# Patient Record
Sex: FEMALE | Race: BLACK OR AFRICAN AMERICAN | NOT HISPANIC OR LATINO | Employment: FULL TIME | URBAN - METROPOLITAN AREA
[De-identification: names, ages, dates, MRNs, and addresses within clinical notes are randomized per-mention and may not be internally consistent; named-entity substitution may affect disease eponyms.]

---

## 2018-03-28 ENCOUNTER — OFFICE VISIT (OUTPATIENT)
Dept: OBSTETRICS AND GYNECOLOGY | Facility: CLINIC | Age: 38
End: 2018-03-28
Payer: MEDICAID

## 2018-03-28 VITALS
HEIGHT: 61 IN | WEIGHT: 200.63 LBS | DIASTOLIC BLOOD PRESSURE: 94 MMHG | BODY MASS INDEX: 37.88 KG/M2 | SYSTOLIC BLOOD PRESSURE: 161 MMHG

## 2018-03-28 DIAGNOSIS — Z11.3 SCREENING FOR GONORRHEA: ICD-10-CM

## 2018-03-28 DIAGNOSIS — B96.89 BACTERIAL VAGINOSIS: ICD-10-CM

## 2018-03-28 DIAGNOSIS — N76.0 BACTERIAL VAGINOSIS: ICD-10-CM

## 2018-03-28 DIAGNOSIS — Z12.4 SCREENING FOR CERVICAL CANCER: ICD-10-CM

## 2018-03-28 DIAGNOSIS — Z01.419 ENCOUNTER FOR GYNECOLOGICAL EXAMINATION (GENERAL) (ROUTINE) WITHOUT ABNORMAL FINDINGS: Primary | ICD-10-CM

## 2018-03-28 PROCEDURE — 87491 CHLMYD TRACH DNA AMP PROBE: CPT

## 2018-03-28 PROCEDURE — 99395 PREV VISIT EST AGE 18-39: CPT | Mod: S$PBB,,, | Performed by: OBSTETRICS & GYNECOLOGY

## 2018-03-28 PROCEDURE — 88175 CYTOPATH C/V AUTO FLUID REDO: CPT

## 2018-03-28 PROCEDURE — 99213 OFFICE O/P EST LOW 20 MIN: CPT | Mod: PBBFAC | Performed by: OBSTETRICS & GYNECOLOGY

## 2018-03-28 PROCEDURE — 99999 PR PBB SHADOW E&M-EST. PATIENT-LVL III: CPT | Mod: PBBFAC,,, | Performed by: OBSTETRICS & GYNECOLOGY

## 2018-03-28 PROCEDURE — 87480 CANDIDA DNA DIR PROBE: CPT

## 2018-03-28 NOTE — PROGRESS NOTES
Subjective:       Patient ID: Sima Pham is a 37 y.o. female.    Chief Complaint:  Well Woman      History of Present Illness  HPI  Annual Exam-Premenopausal  Patient presents for annual exam. The patient has no complaints today. The patient is sexually active--no condoms, current partner of 3 yrs; denies pelvic pain with intercourse, no prob with lubrication or libido; . GYN screening history: last pap: approximate date  and was normal. The patient wears seatbelts: yes. The patient participates in regular exercise: no. Has the patient ever been transfused or tattooed?: yes.--tatooes;  The patient reports that there is not domestic violence in her life.    Menses monthly, flow 4-6  days; super tampon; change q 2-3 hr (want to, not have to); dysmenorrhea--relief with ibuprofen;         GYN & OB History  Patient's last menstrual period was 2018.   Date of Last Pap: No result found    OB History    Para Term  AB Living   2       2     SAB TAB Ectopic Multiple Live Births     2            # Outcome Date GA Lbr Fuentes/2nd Weight Sex Delivery Anes PTL Lv   2 TAB            1 TAB                   Review of Systems  Review of Systems   Constitutional: Negative for activity change, appetite change, chills, diaphoresis, fatigue, fever and unexpected weight change.   HENT: Negative for mouth sores and tinnitus.    Eyes: Negative for discharge and visual disturbance.   Respiratory: Negative for cough, shortness of breath and wheezing.    Cardiovascular: Negative for chest pain, palpitations and leg swelling.   Gastrointestinal: Negative for abdominal pain, bloating, blood in stool, constipation, diarrhea, nausea and vomiting.   Endocrine: Negative for diabetes, hair loss, hot flashes, hyperthyroidism and hypothyroidism.   Genitourinary: Negative for decreased libido, dyspareunia, dysuria, flank pain, frequency, genital sores, hematuria, menorrhagia, menstrual problem, pelvic pain, urgency, vaginal  bleeding, vaginal discharge, vaginal pain, dysmenorrhea, urinary incontinence, postcoital bleeding, postmenopausal bleeding and vaginal odor.   Musculoskeletal: Negative for back pain and myalgias.   Skin:  Negative for rash, no acne and hair changes.   Neurological: Negative for seizures, syncope, numbness and headaches.   Hematological: Negative for adenopathy. Does not bruise/bleed easily.   Psychiatric/Behavioral: Negative for depression and sleep disturbance. The patient is not nervous/anxious.    Breast: Negative for breast mass, breast pain, nipple discharge and skin changes          Objective:    Physical Exam:   Constitutional: She appears well-developed.     Eyes: Conjunctivae and EOM are normal. Pupils are equal, round, and reactive to light.    Neck: Normal range of motion. Neck supple.     Pulmonary/Chest: Effort normal. Right breast exhibits no mass, no nipple discharge, no skin change and no tenderness. Left breast exhibits no mass, no nipple discharge, no skin change and no tenderness. Breasts are symmetrical.        Abdominal: Soft.     Genitourinary: Rectum normal, vagina normal and uterus normal. Pelvic exam was performed with patient supine. Cervix is normal. Right adnexum displays no mass and no tenderness. Left adnexum displays no mass and no tenderness. No erythema, bleeding, rectocele, cystocele or unspecified prolapse of vaginal walls in the vagina. No vaginal discharge found. Labial bartholins normal.       Uterus Size: 6 cm   Musculoskeletal: Normal range of motion.       Neurological: She is alert.    Skin: Skin is warm.    Psychiatric: She has a normal mood and affect.          Assessment:     Encounter Diagnoses   Name Primary?    Encounter for gynecological examination (general) (routine) without abnormal findings Yes    Screening for cervical cancer     Screening for gonorrhea     Bacterial vaginosis                  Plan:      Continue annual well woman exam.  Pap today; Reviewed  updated recommendations for pap smears (every 3 years) in low risk patients.   Recommend annual pelvic exams.  Reviewed recommendations for annual CBE.  Gc/ct/affirm  Advised weight loss, diet, exercise  Prenatal vitamin daily  Pt to self refer to pcp for bp control

## 2018-03-30 LAB
C TRACH DNA SPEC QL NAA+PROBE: NOT DETECTED
CANDIDA RRNA VAG QL PROBE: NEGATIVE
G VAGINALIS RRNA GENITAL QL PROBE: POSITIVE
N GONORRHOEA DNA SPEC QL NAA+PROBE: NOT DETECTED
T VAGINALIS RRNA GENITAL QL PROBE: NEGATIVE

## 2018-04-02 ENCOUNTER — PATIENT MESSAGE (OUTPATIENT)
Dept: OBSTETRICS AND GYNECOLOGY | Facility: CLINIC | Age: 38
End: 2018-04-02

## 2018-04-02 DIAGNOSIS — N76.0 BACTERIAL VAGINOSIS: Primary | ICD-10-CM

## 2018-04-02 DIAGNOSIS — B96.89 BACTERIAL VAGINOSIS: Primary | ICD-10-CM

## 2018-04-02 RX ORDER — METRONIDAZOLE 500 MG/1
500 TABLET ORAL EVERY 12 HOURS
Qty: 14 TABLET | Refills: 0 | Status: SHIPPED | OUTPATIENT
Start: 2018-04-02 | End: 2018-04-09

## 2018-05-23 ENCOUNTER — TELEPHONE (OUTPATIENT)
Dept: OBSTETRICS AND GYNECOLOGY | Facility: CLINIC | Age: 38
End: 2018-05-23

## 2018-05-23 NOTE — TELEPHONE ENCOUNTER
----- Message from Yoshi Yap MA sent at 5/23/2018  4:10 PM CDT -----  Contact: Pt   Pt called and would like to schedule a appt regarding missed period.      Pt can be reached at [phone].      Thanks

## 2018-05-23 NOTE — TELEPHONE ENCOUNTER
Patient states that last period was April 14th. She states that she has never missed her period. Patient took many pregnancy tests and they are negative so she would like to be checked to see what is going on with her menstrual cycle. Scheduled an appointment with Guillermina salcedo NP for 05/25/18 at 8:45am at the [facility] location. Patient verbalized understanding.

## 2018-05-25 ENCOUNTER — LAB VISIT (OUTPATIENT)
Dept: LAB | Facility: HOSPITAL | Age: 38
End: 2018-05-25
Attending: NURSE PRACTITIONER
Payer: MEDICAID

## 2018-05-25 ENCOUNTER — OFFICE VISIT (OUTPATIENT)
Dept: OBSTETRICS AND GYNECOLOGY | Facility: CLINIC | Age: 38
End: 2018-05-25
Payer: MEDICAID

## 2018-05-25 ENCOUNTER — TELEPHONE (OUTPATIENT)
Dept: OBSTETRICS AND GYNECOLOGY | Facility: CLINIC | Age: 38
End: 2018-05-25

## 2018-05-25 ENCOUNTER — OFFICE VISIT (OUTPATIENT)
Dept: INTERNAL MEDICINE | Facility: CLINIC | Age: 38
End: 2018-05-25
Payer: MEDICAID

## 2018-05-25 VITALS
HEIGHT: 61 IN | BODY MASS INDEX: 37.47 KG/M2 | DIASTOLIC BLOOD PRESSURE: 100 MMHG | SYSTOLIC BLOOD PRESSURE: 170 MMHG | WEIGHT: 198.44 LBS

## 2018-05-25 VITALS
TEMPERATURE: 98 F | WEIGHT: 195.56 LBS | HEART RATE: 94 BPM | BODY MASS INDEX: 36.92 KG/M2 | SYSTOLIC BLOOD PRESSURE: 148 MMHG | DIASTOLIC BLOOD PRESSURE: 84 MMHG | OXYGEN SATURATION: 98 % | HEIGHT: 61 IN

## 2018-05-25 DIAGNOSIS — R03.0 SINGLE EPISODE OF ELEVATED BLOOD PRESSURE: ICD-10-CM

## 2018-05-25 DIAGNOSIS — N91.2 AMENORRHEA: ICD-10-CM

## 2018-05-25 DIAGNOSIS — I10 HYPERTENSION, UNSPECIFIED TYPE: Primary | ICD-10-CM

## 2018-05-25 DIAGNOSIS — N91.2 AMENORRHEA: Primary | ICD-10-CM

## 2018-05-25 LAB
ALBUMIN SERPL BCP-MCNC: 3.9 G/DL
ALP SERPL-CCNC: 85 U/L
ALT SERPL W/O P-5'-P-CCNC: 18 U/L
ANION GAP SERPL CALC-SCNC: 8 MMOL/L
AST SERPL-CCNC: 18 U/L
BASOPHILS # BLD AUTO: 0.02 K/UL
BASOPHILS NFR BLD: 0.5 %
BILIRUB SERPL-MCNC: 1.2 MG/DL
BUN SERPL-MCNC: 10 MG/DL
CALCIUM SERPL-MCNC: 9.5 MG/DL
CHLORIDE SERPL-SCNC: 104 MMOL/L
CO2 SERPL-SCNC: 27 MMOL/L
CREAT SERPL-MCNC: 0.9 MG/DL
DIFFERENTIAL METHOD: ABNORMAL
EOSINOPHIL # BLD AUTO: 0 K/UL
EOSINOPHIL NFR BLD: 0.5 %
ERYTHROCYTE [DISTWIDTH] IN BLOOD BY AUTOMATED COUNT: 13.2 %
EST. GFR  (AFRICAN AMERICAN): >60 ML/MIN/1.73 M^2
EST. GFR  (NON AFRICAN AMERICAN): >60 ML/MIN/1.73 M^2
GLUCOSE SERPL-MCNC: 95 MG/DL
HCG INTACT+B SERPL-ACNC: <1.2 MIU/ML
HCT VFR BLD AUTO: 36.7 %
HGB BLD-MCNC: 12.3 G/DL
LYMPHOCYTES # BLD AUTO: 1.6 K/UL
LYMPHOCYTES NFR BLD: 39.3 %
MCH RBC QN AUTO: 29.8 PG
MCHC RBC AUTO-ENTMCNC: 33.5 G/DL
MCV RBC AUTO: 89 FL
MONOCYTES # BLD AUTO: 0.4 K/UL
MONOCYTES NFR BLD: 9.5 %
NEUTROPHILS # BLD AUTO: 2.1 K/UL
NEUTROPHILS NFR BLD: 50.2 %
PLATELET # BLD AUTO: 197 K/UL
PMV BLD AUTO: 11.1 FL
POTASSIUM SERPL-SCNC: 4 MMOL/L
PROT SERPL-MCNC: 7.9 G/DL
RBC # BLD AUTO: 4.13 M/UL
SODIUM SERPL-SCNC: 139 MMOL/L
TSH SERPL DL<=0.005 MIU/L-ACNC: 1.95 UIU/ML
WBC # BLD AUTO: 4.1 K/UL

## 2018-05-25 PROCEDURE — 84702 CHORIONIC GONADOTROPIN TEST: CPT

## 2018-05-25 PROCEDURE — 84443 ASSAY THYROID STIM HORMONE: CPT

## 2018-05-25 PROCEDURE — 99999 PR PBB SHADOW E&M-EST. PATIENT-LVL II: CPT | Mod: PBBFAC,,, | Performed by: NURSE PRACTITIONER

## 2018-05-25 PROCEDURE — 99212 OFFICE O/P EST SF 10 MIN: CPT | Mod: PBBFAC,27 | Performed by: NURSE PRACTITIONER

## 2018-05-25 PROCEDURE — 99213 OFFICE O/P EST LOW 20 MIN: CPT | Mod: PBBFAC,27 | Performed by: FAMILY MEDICINE

## 2018-05-25 PROCEDURE — 99203 OFFICE O/P NEW LOW 30 MIN: CPT | Mod: S$PBB,,, | Performed by: FAMILY MEDICINE

## 2018-05-25 PROCEDURE — 36415 COLL VENOUS BLD VENIPUNCTURE: CPT

## 2018-05-25 PROCEDURE — 99213 OFFICE O/P EST LOW 20 MIN: CPT | Mod: S$PBB,,, | Performed by: NURSE PRACTITIONER

## 2018-05-25 PROCEDURE — 99999 PR PBB SHADOW E&M-EST. PATIENT-LVL III: CPT | Mod: PBBFAC,,, | Performed by: FAMILY MEDICINE

## 2018-05-25 PROCEDURE — 85025 COMPLETE CBC W/AUTO DIFF WBC: CPT

## 2018-05-25 PROCEDURE — 80053 COMPREHEN METABOLIC PANEL: CPT

## 2018-05-25 RX ORDER — NIFEDIPINE 30 MG/1
30 TABLET, EXTENDED RELEASE ORAL DAILY
Qty: 30 TABLET | Refills: 0 | Status: SHIPPED | OUTPATIENT
Start: 2018-05-25 | End: 2018-06-21 | Stop reason: SDUPTHER

## 2018-05-25 NOTE — PROGRESS NOTES
Subjective:       Patient ID: Sima Pham is a 38 y.o. female.    Chief Complaint: Hypertension; Lumbar Spine Pain (L-Spine); and Abdominal Cramping    HPI       37 yo AAF    Went to Gyn  Noted BP was high        Sent down to have BP evaluated..    Non smoker  Non drinker - rare  No drugs    In March BP was elevated as well - 161/94.    UPT negative.  Blood test obtained to test for pregnancy today. - Negative.    Not trying to get pregnant - but open to the idea      CBC today  Very mild low HCT - normal Hgb    CMP  Very mild bili elevation    TSH WNL and preg test negative.    Review of Systems   Constitutional: Negative for chills and fever.   HENT: Negative for congestion, sinus pressure and voice change.    Eyes: Negative for visual disturbance.   Respiratory: Negative for shortness of breath.    Cardiovascular: Negative for chest pain.   Gastrointestinal: Negative for constipation and diarrhea.   Genitourinary: Negative for difficulty urinating.   Musculoskeletal: Negative for gait problem and myalgias.   Skin: Negative for rash.   Neurological: Negative for dizziness and light-headedness.   Psychiatric/Behavioral: Negative for dysphoric mood.       Objective:       Vitals:    05/25/18 1111   BP: (!) 148/84   Pulse: 94   Temp: 97.7 °F (36.5 °C)       Physical Exam   Constitutional: She is oriented to person, place, and time. She appears well-developed and well-nourished. She does not have a sickly appearance. No distress.   HENT:   Head: Normocephalic and atraumatic.   Right Ear: External ear normal.   Left Ear: External ear normal.   Eyes: Conjunctivae, EOM and lids are normal.   Neck: Trachea normal, normal range of motion and full passive range of motion without pain.   Cardiovascular: Normal rate, regular rhythm and normal heart sounds.    Pulmonary/Chest: Effort normal and breath sounds normal. No stridor. No respiratory distress.   Abdominal: Soft. Normal appearance and bowel sounds are normal. She  exhibits no distension. There is no tenderness. There is no guarding. No hernia.   Musculoskeletal: Normal range of motion.   Lymphadenopathy:     She has no cervical adenopathy.   Neurological: She is alert and oriented to person, place, and time. She is not disoriented.   Skin: Skin is warm, dry and intact. No rash noted. She is not diaphoretic.   Psychiatric: She has a normal mood and affect. Her speech is normal and behavior is normal. Thought content normal.       Assessment:       1. Hypertension, unspecified type        Plan:   Hypertension, unspecified type    Elevated at Gyn appt today and 2 months ago  Suspect has HTN  Strongly encouraged purchase of BP cuff  Check and log daily  Starting at a low dose CCB- nifedipine ER - given potential she can become pregnant.  She is to log daily - a few times daily if possible  Bring log in in 2 weeks.  She is to lose some weight. Discussed extensively       F/u in 2 weeks for BP check and potential med adjustment - will double dose if needed.  -     NIFEdipine (PROCARDIA-XL) 30 MG (OSM) 24 hr tablet; Take 1 tablet (30 mg total) by mouth once daily.  Dispense: 30 tablet; Refill: 0      In 2 weeks I would like a lipid panel - plan on fasting lab      No Follow-up on file.

## 2018-05-25 NOTE — TELEPHONE ENCOUNTER
Spoke with pt and informed of normal labs, and to monitor her cycle. Instructed that if she does not have a period in 2 weeks to contact the clinic. Pt voiced understanding.

## 2018-05-25 NOTE — TELEPHONE ENCOUNTER
----- Message from Guillermina Sesay NP sent at 5/25/2018 11:07 AM CDT -----  Labs are normal. Please have her monitor her cycle and call the clinic if it has not started in 2 weeks.

## 2018-05-25 NOTE — PROGRESS NOTES
"CC: Missed cycles    Sima Pham is a 38 y.o. female  presents for c/o missed her last cycle. UPT in clinic was negative. No pelvic pain. Is sexually active, no birth control. Patient has an elevated B/P reading . States that she  Does not have a PCP. No blurred vision, chest pain or SOB.  LMP: Patient's last menstrual period was 2018..      History reviewed. No pertinent past medical history.  Past Surgical History:   Procedure Laterality Date    DILATION AND CURETTAGE OF UTERUS       Social History     Social History    Marital status: Single     Spouse name: N/A    Number of children: 0    Years of education: N/A     Occupational History    Not on file.     Social History Main Topics    Smoking status: Never Smoker    Smokeless tobacco: Never Used    Alcohol use No    Drug use: No    Sexual activity: Yes     Partners: Male     Birth control/ protection: None     Other Topics Concern    Not on file     Social History Narrative    No narrative on file     Family History   Problem Relation Age of Onset    Heart disease Mother     Hypertension Father     Lung cancer Paternal Aunt     Diabetes Paternal Aunt     Hypertension Paternal Grandmother     Diabetes Cousin     Stroke Neg Hx     Kidney disease Neg Hx     Breast cancer Neg Hx     Colon cancer Neg Hx     Ovarian cancer Neg Hx      OB History      Para Term  AB Living    2       2      SAB TAB Ectopic Multiple Live Births      2                BP (!) 170/100   Ht 5' 1" (1.549 m)   Wt 90 kg (198 lb 6.6 oz)   LMP 2018   BMI 37.49 kg/m²       ROS:  GENERAL: Denies weight gain or weight loss. Feeling well overall.   SKIN: Denies rash or lesions.   HEAD: Denies head injury or headache.   NODES: Denies enlarged lymph nodes.   CHEST: Denies chest pain or shortness of breath.   CARDIOVASCULAR: Denies palpitations or left sided chest pain.   ABDOMEN: No abdominal pain, constipation, diarrhea, nausea, vomiting " or rectal bleeding.   URINARY: No frequency, dysuria, hematuria, or burning on urination.  REPRODUCTIVE: See HPI.   BREASTS: The patient performs breast self-examination and denies pain, lumps, or nipple discharge.   HEMATOLOGIC: No easy bruisability or excessive bleeding.   MUSCULOSKELETAL: Denies joint pain or swelling.   NEUROLOGIC: Denies syncope or weakness.   PSYCHIATRIC: Denies depression, anxiety or mood swings.    PHYSICAL EXAM:  APPEARANCE: Well nourished, well developed, in no acute distress.  AFFECT: WNL, alert and oriented x 3    1. Amenorrhea  TSH    hCG, quantitative   2. Single episode of elevated blood pressure  CBC auto differential    Comprehensive metabolic panel     PLAN:  Labs  For amenorrhea and HTN  Patient to f/u today at urgent care for HTN, if she can not be seen by PCP.       Patient was counseled today on A.C.S. Pap guidelines and recommendations for yearly pelvic exams, mammograms and monthly self breast exams; to see her PCP for other health maintenance.

## 2018-06-01 ENCOUNTER — PATIENT OUTREACH (OUTPATIENT)
Dept: ADMINISTRATIVE | Facility: HOSPITAL | Age: 38
End: 2018-06-01

## 2018-06-21 RX ORDER — NIFEDIPINE 30 MG/1
30 TABLET, EXTENDED RELEASE ORAL DAILY
Qty: 30 TABLET | Refills: 0 | Status: SHIPPED | OUTPATIENT
Start: 2018-06-21 | End: 2018-08-02 | Stop reason: SDUPTHER

## 2018-07-19 RX ORDER — NIFEDIPINE 30 MG/1
30 TABLET, EXTENDED RELEASE ORAL DAILY
Qty: 30 TABLET | Refills: 0 | OUTPATIENT
Start: 2018-07-19 | End: 2019-07-19

## 2018-08-03 RX ORDER — NIFEDIPINE 30 MG/1
30 TABLET, EXTENDED RELEASE ORAL DAILY
Qty: 30 TABLET | Refills: 0 | Status: SHIPPED | OUTPATIENT
Start: 2018-08-03 | End: 2018-09-06 | Stop reason: SDUPTHER

## 2018-08-03 NOTE — TELEPHONE ENCOUNTER
One month given.  Schedule her with Dr. Elmore.  She cancelled her follow up for HTN recheck and needs to be rescheduled.  Override his schedule to have her see him.

## 2018-08-03 NOTE — TELEPHONE ENCOUNTER
Patient informed 30 day supply of Procardia sent to pharmacy and any other refills will be given after her follow up appointment with Dr Elmore

## 2018-09-06 RX ORDER — NIFEDIPINE 30 MG/1
30 TABLET, EXTENDED RELEASE ORAL DAILY
Qty: 30 TABLET | Refills: 0 | Status: SHIPPED | OUTPATIENT
Start: 2018-09-06 | End: 2018-10-10 | Stop reason: SDUPTHER

## 2018-09-27 ENCOUNTER — LAB VISIT (OUTPATIENT)
Dept: LAB | Facility: HOSPITAL | Age: 38
End: 2018-09-27
Attending: NURSE PRACTITIONER
Payer: MEDICAID

## 2018-09-27 ENCOUNTER — TELEPHONE (OUTPATIENT)
Dept: OBSTETRICS AND GYNECOLOGY | Facility: CLINIC | Age: 38
End: 2018-09-27

## 2018-09-27 ENCOUNTER — OFFICE VISIT (OUTPATIENT)
Dept: OBSTETRICS AND GYNECOLOGY | Facility: CLINIC | Age: 38
End: 2018-09-27
Payer: MEDICAID

## 2018-09-27 VITALS
SYSTOLIC BLOOD PRESSURE: 156 MMHG | DIASTOLIC BLOOD PRESSURE: 94 MMHG | WEIGHT: 195.56 LBS | HEIGHT: 61 IN | BODY MASS INDEX: 36.92 KG/M2

## 2018-09-27 DIAGNOSIS — N93.8 DUB (DYSFUNCTIONAL UTERINE BLEEDING): Primary | ICD-10-CM

## 2018-09-27 DIAGNOSIS — N93.8 DUB (DYSFUNCTIONAL UTERINE BLEEDING): ICD-10-CM

## 2018-09-27 LAB
DHEA-S SERPL-MCNC: 304.4 UG/DL
HCG INTACT+B SERPL-ACNC: <1.2 MIU/ML
PROLACTIN SERPL IA-MCNC: 12.9 NG/ML
TSH SERPL DL<=0.005 MIU/L-ACNC: 2.1 UIU/ML

## 2018-09-27 PROCEDURE — 99212 OFFICE O/P EST SF 10 MIN: CPT | Mod: PBBFAC | Performed by: NURSE PRACTITIONER

## 2018-09-27 PROCEDURE — 84146 ASSAY OF PROLACTIN: CPT

## 2018-09-27 PROCEDURE — 99213 OFFICE O/P EST LOW 20 MIN: CPT | Mod: S$PBB,,, | Performed by: NURSE PRACTITIONER

## 2018-09-27 PROCEDURE — 82627 DEHYDROEPIANDROSTERONE: CPT

## 2018-09-27 PROCEDURE — 83525 ASSAY OF INSULIN: CPT

## 2018-09-27 PROCEDURE — 84702 CHORIONIC GONADOTROPIN TEST: CPT

## 2018-09-27 PROCEDURE — 84443 ASSAY THYROID STIM HORMONE: CPT

## 2018-09-27 PROCEDURE — 99999 PR PBB SHADOW E&M-EST. PATIENT-LVL II: CPT | Mod: PBBFAC,,, | Performed by: NURSE PRACTITIONER

## 2018-09-27 PROCEDURE — 36415 COLL VENOUS BLD VENIPUNCTURE: CPT

## 2018-09-27 NOTE — TELEPHONE ENCOUNTER
Spoke to patient. Patient stated that she was scheduled for an appointment regarding her hormones and some changes in her body that she was experiencing. Stated that she has seen Guillermina Rushing recently. Assisted patient with scheduling appt for this afternoon to see Guillermina. Patient verbalized understanding.

## 2018-09-27 NOTE — PROGRESS NOTES
"CC: SHERIN Pham is a 38 y.o. female  presents for irregular cycles not heavy. LMP: Patient's last menstrual period was 2018..  Patient reports that she had a 19 day cycle in May and cycles have been longer, not heavy. No birth control. Is sexually active. No pelvic pain. Wanting to conceive. Was seen by dermatology and " told that her skin was darker due to hormones".     History reviewed. No pertinent past medical history.  Past Surgical History:   Procedure Laterality Date    DILATION AND CURETTAGE OF UTERUS       Social History     Socioeconomic History    Marital status: Single     Spouse name: Not on file    Number of children: 0    Years of education: Not on file    Highest education level: Not on file   Social Needs    Financial resource strain: Not on file    Food insecurity - worry: Not on file    Food insecurity - inability: Not on file    Transportation needs - medical: Not on file    Transportation needs - non-medical: Not on file   Occupational History    Not on file   Tobacco Use    Smoking status: Never Smoker    Smokeless tobacco: Never Used   Substance and Sexual Activity    Alcohol use: Yes     Alcohol/week: 0.0 oz     Frequency: Monthly or less     Drinks per session: 1 or 2     Binge frequency: Never    Drug use: No    Sexual activity: Yes     Partners: Male     Birth control/protection: None   Other Topics Concern    Not on file   Social History Narrative    Not on file     Family History   Problem Relation Age of Onset    Heart disease Mother     Hypertension Father     Lung cancer Paternal Aunt     Diabetes Paternal Aunt     Hypertension Paternal Grandmother     Diabetes Cousin     Stroke Neg Hx     Kidney disease Neg Hx     Breast cancer Neg Hx     Colon cancer Neg Hx     Ovarian cancer Neg Hx      OB History      Para Term  AB Living    2       2      SAB TAB Ectopic Multiple Live Births      2                BP (!) 156/94 " "(BP Location: Right arm, Patient Position: Sitting, BP Method: Medium (Manual))   Ht 5' 1" (1.549 m)   Wt 88.7 kg (195 lb 8.8 oz)   LMP 09/16/2018   BMI 36.95 kg/m²       ROS:  GENERAL: Denies weight gain or weight loss. Feeling well overall.   SKIN: HPI  ABDOMEN: No abdominal pain, constipation, diarrhea, nausea, vomiting or rectal bleeding.   URINARY: No frequency, dysuria, hematuria, or burning on urination.  REPRODUCTIVE: See HPI.       PHYSICAL EXAM:  APPEARANCE: Obese AA female, in no acute distress.  AFFECT: WNL, alert and oriented x 3  SKIN: + hirsutism    1. DUB (dysfunctional uterine bleeding)  TSH    Prolactin    DHEA-sulfate    hCG, quantitative    Insulin, random    US Pelvis Complete Non OB    PLAN:  Labs and ultrasound               "

## 2018-09-27 NOTE — TELEPHONE ENCOUNTER
----- Message from Brinda Brian sent at 9/27/2018  7:55 AM CDT -----  Pt at [phone]//states she had an appt tomorrow//9-28-18 which she had to cancel//is wanting to reschedule but she has Medicaid and there are no appts available//please call/taina

## 2018-09-28 ENCOUNTER — TELEPHONE (OUTPATIENT)
Dept: OBSTETRICS AND GYNECOLOGY | Facility: CLINIC | Age: 38
End: 2018-09-28

## 2018-09-28 LAB
INSULIN COLLECTION INTERVAL: ABNORMAL
INSULIN SERPL-ACNC: 55.9 UU/ML

## 2018-09-28 RX ORDER — METFORMIN HYDROCHLORIDE 500 MG/1
500 TABLET ORAL 2 TIMES DAILY WITH MEALS
Qty: 60 TABLET | Refills: 6 | Status: SHIPPED | OUTPATIENT
Start: 2018-09-28 | End: 2018-11-30

## 2018-09-28 NOTE — TELEPHONE ENCOUNTER
----- Message from Guillermina Sesya NP sent at 9/28/2018  8:01 AM CDT -----  Needs follow-up appt ( PCOS) in 2-3 months.

## 2018-10-02 ENCOUNTER — HOSPITAL ENCOUNTER (OUTPATIENT)
Dept: RADIOLOGY | Facility: HOSPITAL | Age: 38
Discharge: HOME | End: 2018-10-02
Attending: NURSE PRACTITIONER
Payer: MEDICAID

## 2018-10-02 ENCOUNTER — TELEPHONE (OUTPATIENT)
Dept: RADIOLOGY | Facility: HOSPITAL | Age: 38
End: 2018-10-02

## 2018-10-02 DIAGNOSIS — N93.8 DUB (DYSFUNCTIONAL UTERINE BLEEDING): ICD-10-CM

## 2018-10-02 PROCEDURE — 76830 TRANSVAGINAL US NON-OB: CPT | Mod: 26,,, | Performed by: RADIOLOGY

## 2018-10-02 PROCEDURE — 76856 US EXAM PELVIC COMPLETE: CPT | Mod: TC

## 2018-10-02 PROCEDURE — 76856 US EXAM PELVIC COMPLETE: CPT | Mod: 26,,, | Performed by: RADIOLOGY

## 2018-10-02 PROCEDURE — 76830 TRANSVAGINAL US NON-OB: CPT | Mod: TC

## 2018-10-04 ENCOUNTER — TELEPHONE (OUTPATIENT)
Dept: OBSTETRICS AND GYNECOLOGY | Facility: CLINIC | Age: 38
End: 2018-10-04

## 2018-10-04 NOTE — TELEPHONE ENCOUNTER
----- Message from Guillermina Sesay NP sent at 10/4/2018  9:05 AM CDT -----  Please call patient and inform  Her that ultrasound indicated uterine fibroids. Please have her f/u with Dr. Cantu to discuss treatment options.

## 2018-10-04 NOTE — PROGRESS NOTES
Please call patient and inform  Her that ultrasound indicated uterine fibroids. Please have her f/u with Dr. Cantu to discuss treatment options.

## 2018-10-04 NOTE — TELEPHONE ENCOUNTER
Spoke with pt  Notified of pelvic ultrasound results. Scheduled with DEANNE Cantu on 10/30/18 11:00 AM. Patient verbalized understanding

## 2018-10-11 RX ORDER — NIFEDIPINE 30 MG/1
TABLET, EXTENDED RELEASE ORAL
Qty: 30 TABLET | Refills: 0 | Status: SHIPPED | OUTPATIENT
Start: 2018-10-11 | End: 2022-10-10

## 2018-10-30 ENCOUNTER — OFFICE VISIT (OUTPATIENT)
Dept: OBSTETRICS AND GYNECOLOGY | Facility: CLINIC | Age: 38
End: 2018-10-30
Payer: MEDICAID

## 2018-10-30 VITALS — WEIGHT: 192 LBS | SYSTOLIC BLOOD PRESSURE: 148 MMHG | DIASTOLIC BLOOD PRESSURE: 86 MMHG | BODY MASS INDEX: 36.28 KG/M2

## 2018-10-30 DIAGNOSIS — D25.1 FIBROIDS, INTRAMURAL: Primary | ICD-10-CM

## 2018-10-30 PROCEDURE — 99213 OFFICE O/P EST LOW 20 MIN: CPT | Mod: S$PBB,,, | Performed by: OBSTETRICS & GYNECOLOGY

## 2018-10-30 PROCEDURE — 99212 OFFICE O/P EST SF 10 MIN: CPT | Mod: PBBFAC | Performed by: OBSTETRICS & GYNECOLOGY

## 2018-10-30 PROCEDURE — 99999 PR PBB SHADOW E&M-EST. PATIENT-LVL II: CPT | Mod: PBBFAC,,, | Performed by: OBSTETRICS & GYNECOLOGY

## 2018-10-30 NOTE — PROGRESS NOTES
Subjective:       Patient ID: Sima Pham is a 38 y.o. female.    Chief Complaint:  Consult      History of Present Illness  HPI  here for follow up     Pt reports late menses in may; then had flow with heavy clots  Feels  cycle was also late;   July to October cycles were normal  Flow 5-6d, super tampon, heavy flow for 1 day; changing tampon q 3 hrs;   Still interested in conception    sono findings reviewed  Uterus:    Size: 11.9 x 4.0 x 5.9 cm    Masses: Multiple nodular areas of heterogeneous decreased echotexture consistent with fibroids.  Two posterior midbody fibroids measuring 3.0 x 2.2 x 2.8 cm and 1.7 x 21.2 x 1.5 cm.  Anterior fundal fibroid suggested measuring 1.2 x 0.9 x 1.1 cm.    Endometrium: Normal in this pre menopausal patient, measuring 7.5 mm.    Multiple nabothian cysts incidentally noted.    Right ovary:    Size: 3.2 x 1.7 x 2.0 cm    Appearance: Normal    Vascular flow: Normal.    Left ovary:    Size: 3.1 x 2.7 x 2.8 cm    Appearance: Dominant follicle versus small simple cyst noted measuring 2.2 x 1.9 x 2.3 cm.    Vascular Flow: Normal.    Free Fluid:    Trace free fluid within the posterior cul-de-sac.      Impression       Mildly prominent uterus with multiple fibroids as detailed above.    Incidental note made of nabothian cysts.    Dominant left ovarian follicle versus simple cyst measuring 2.3 cm maximum diameter.    Trace free fluid within the posterior cul-de-sac.         GYN & OB History  Patient's last menstrual period was 10/12/2018 (approximate).   Date of Last Pap: 2018    OB History    Para Term  AB Living   2       2     SAB TAB Ectopic Multiple Live Births     2            # Outcome Date GA Lbr Fuetnes/2nd Weight Sex Delivery Anes PTL Lv   2 TAB            1 TAB                   Review of Systems  Review of Systems   Genitourinary: Positive for menstrual problem.   All other systems reviewed and are negative.          Objective:      Physical Exam:    Constitutional: She appears well-developed.     Eyes: Conjunctivae and EOM are normal. Pupils are equal, round, and reactive to light.    Neck: Normal range of motion. Neck supple.     Pulmonary/Chest: Effort normal. Right breast exhibits no mass, no nipple discharge, no skin change and no tenderness. Left breast exhibits no mass, no nipple discharge, no skin change and no tenderness. Breasts are symmetrical.        Abdominal: Soft.     Genitourinary: Rectum normal. Pelvic exam was performed with patient supine. Cervix is normal. Right adnexum displays no mass and no tenderness. Left adnexum displays no mass and no tenderness. No erythema, bleeding, rectocele, cystocele or unspecified prolapse of vaginal walls in the vagina. No vaginal discharge found. Labial bartholins normal.       Uterus Size: 6 cm   Musculoskeletal: Normal range of motion.       Neurological: She is alert.    Skin: Skin is warm.    Psychiatric: She has a normal mood and affect.           Assessment:     Encounter Diagnosis   Name Primary?    Fibroids, intramural Yes               Plan:      reasurrance given re: fibroids  Nothing needed at this time other than continued observation; menstrual calendar  encouraged diet, exercise, weight loss (13-140lbs when had tab x 2)

## 2018-11-30 ENCOUNTER — OFFICE VISIT (OUTPATIENT)
Dept: OBSTETRICS AND GYNECOLOGY | Facility: CLINIC | Age: 38
End: 2018-11-30
Payer: MEDICAID

## 2018-11-30 VITALS
WEIGHT: 192.88 LBS | BODY MASS INDEX: 36.42 KG/M2 | HEIGHT: 61 IN | DIASTOLIC BLOOD PRESSURE: 82 MMHG | SYSTOLIC BLOOD PRESSURE: 148 MMHG

## 2018-11-30 DIAGNOSIS — E28.2 PCOS (POLYCYSTIC OVARIAN SYNDROME): Primary | ICD-10-CM

## 2018-11-30 PROCEDURE — 99999 PR PBB SHADOW E&M-EST. PATIENT-LVL II: CPT | Mod: PBBFAC,,, | Performed by: NURSE PRACTITIONER

## 2018-11-30 PROCEDURE — 99213 OFFICE O/P EST LOW 20 MIN: CPT | Mod: S$PBB,,, | Performed by: NURSE PRACTITIONER

## 2018-11-30 PROCEDURE — 99212 OFFICE O/P EST SF 10 MIN: CPT | Mod: PBBFAC | Performed by: NURSE PRACTITIONER

## 2018-11-30 RX ORDER — KETOCONAZOLE 20 MG/G
1 CREAM TOPICAL 2 TIMES DAILY
Refills: 3 | COMMUNITY
Start: 2018-10-09

## 2018-11-30 RX ORDER — PIMECROLIMUS 10 MG/G
1 CREAM TOPICAL 2 TIMES DAILY
Refills: 3 | COMMUNITY
Start: 2018-10-10

## 2018-11-30 NOTE — PROGRESS NOTES
CC: Follow-up PCOS    Sima Pham is a 38 y.o. female  presents for well woman exam.  LMP: Patient's last menstrual period was 2018..  No pelvic pain. Patient stopped Metformin secondary to GI discomfort. Patient reports that cycles are every 26-30 days. Patient was seen by Dr. Cantu for ultrasound that indicated fibroids.      Past Medical History:   Diagnosis Date    Diabetes mellitus     Pre-diabetes    Hypertension      Past Surgical History:   Procedure Laterality Date    DILATION AND CURETTAGE OF UTERUS       Social History     Socioeconomic History    Marital status: Single     Spouse name: Not on file    Number of children: 0    Years of education: Not on file    Highest education level: Not on file   Social Needs    Financial resource strain: Not on file    Food insecurity - worry: Not on file    Food insecurity - inability: Not on file    Transportation needs - medical: Not on file    Transportation needs - non-medical: Not on file   Occupational History    Not on file   Tobacco Use    Smoking status: Never Smoker    Smokeless tobacco: Never Used   Substance and Sexual Activity    Alcohol use: Yes     Alcohol/week: 0.0 oz     Frequency: 2-4 times a month     Drinks per session: 1 or 2     Binge frequency: Never    Drug use: No    Sexual activity: Yes     Partners: Male     Birth control/protection: None   Other Topics Concern    Not on file   Social History Narrative    Not on file     Family History   Problem Relation Age of Onset    Heart disease Mother     Hypertension Father     Lung cancer Paternal Aunt     Diabetes Paternal Aunt     Hypertension Paternal Grandmother     Diabetes Cousin     Stroke Neg Hx     Kidney disease Neg Hx     Breast cancer Neg Hx     Colon cancer Neg Hx     Ovarian cancer Neg Hx      OB History      Para Term  AB Living    2       2      SAB TAB Ectopic Multiple Live Births      2                BP (!) 148/82   Ht  "5' 1" (1.549 m)   Wt 87.5 kg (192 lb 14.4 oz)   LMP 11/12/2018   BMI 36.45 kg/m²       ROS:  GENERAL: Denies weight gain or weight loss. Feeling well overall.   SKIN: Denies rash or lesions.   HEAD: Denies head injury or headache.   NODES: Denies enlarged lymph nodes.   CHEST: Denies chest pain or shortness of breath.   CARDIOVASCULAR: Denies palpitations or left sided chest pain.   ABDOMEN: No abdominal pain, constipation, diarrhea, nausea, vomiting or rectal bleeding.   URINARY: No frequency, dysuria, hematuria, or burning on urination.  REPRODUCTIVE: See HPI.   BREASTS: The patient performs breast self-examination and denies pain, lumps, or nipple discharge.   HEMATOLOGIC: No easy bruisability or excessive bleeding.   MUSCULOSKELETAL: Denies joint pain or swelling.   NEUROLOGIC: Denies syncope or weakness.   PSYCHIATRIC: Denies depression, anxiety or mood swings.    PHYSICAL EXAM:  APPEARANCE: Well nourished, well developed, in no acute distress.  AFFECT: WNL, alert and oriented x 3     PLAN:  Patient is doing well and cycles have regulated. Patient was encouraged to increase exercise.            "

## 2019-01-28 ENCOUNTER — PROCEDURE VISIT (OUTPATIENT)
Dept: OBSTETRICS AND GYNECOLOGY | Facility: CLINIC | Age: 39
End: 2019-01-28
Payer: MEDICAID

## 2019-01-28 ENCOUNTER — LAB VISIT (OUTPATIENT)
Dept: LAB | Facility: HOSPITAL | Age: 39
End: 2019-01-28
Attending: ADVANCED PRACTICE MIDWIFE
Payer: MEDICAID

## 2019-01-28 ENCOUNTER — INITIAL PRENATAL (OUTPATIENT)
Dept: OBSTETRICS AND GYNECOLOGY | Facility: CLINIC | Age: 39
End: 2019-01-28
Payer: MEDICAID

## 2019-01-28 VITALS
DIASTOLIC BLOOD PRESSURE: 70 MMHG | BODY MASS INDEX: 37.24 KG/M2 | WEIGHT: 197.06 LBS | SYSTOLIC BLOOD PRESSURE: 148 MMHG

## 2019-01-28 DIAGNOSIS — O26.841 UTERINE SIZE DATE DISCREPANCY, FIRST TRIMESTER: ICD-10-CM

## 2019-01-28 DIAGNOSIS — O99.211 OBESITY AFFECTING PREGNANCY IN FIRST TRIMESTER: Primary | ICD-10-CM

## 2019-01-28 DIAGNOSIS — O99.211 OBESITY AFFECTING PREGNANCY IN FIRST TRIMESTER: ICD-10-CM

## 2019-01-28 DIAGNOSIS — O98.511 HSV-2 INFECTION COMPLICATING PREGNANCY, FIRST TRIMESTER: ICD-10-CM

## 2019-01-28 DIAGNOSIS — O10.019 HYPERTENSION IN PREGNANCY, ESSENTIAL: ICD-10-CM

## 2019-01-28 DIAGNOSIS — B00.9 HSV-2 INFECTION COMPLICATING PREGNANCY, FIRST TRIMESTER: ICD-10-CM

## 2019-01-28 DIAGNOSIS — O09.521 SUPERVISION OF ELDERLY MULTIGRAVIDA (>=35 YEARS OLD AT TIME OF DELIVERY), FIRST TRIMESTER: ICD-10-CM

## 2019-01-28 PROBLEM — Z34.81 SUPERVISION OF NORMAL INTRAUTERINE PREGNANCY IN MULTIGRAVIDA IN FIRST TRIMESTER: Status: ACTIVE | Noted: 2019-01-28

## 2019-01-28 LAB
ABO + RH BLD: NORMAL
BASOPHILS # BLD AUTO: 0.03 K/UL
BASOPHILS NFR BLD: 0.6 %
BLD GP AB SCN CELLS X3 SERPL QL: NORMAL
DIFFERENTIAL METHOD: ABNORMAL
EOSINOPHIL # BLD AUTO: 0 K/UL
EOSINOPHIL NFR BLD: 0.2 %
ERYTHROCYTE [DISTWIDTH] IN BLOOD BY AUTOMATED COUNT: 14 %
HCT VFR BLD AUTO: 38.2 %
HGB BLD-MCNC: 12 G/DL
IMM GRANULOCYTES # BLD AUTO: 0.01 K/UL
IMM GRANULOCYTES NFR BLD AUTO: 0.2 %
LYMPHOCYTES # BLD AUTO: 1.5 K/UL
LYMPHOCYTES NFR BLD: 28.8 %
MCH RBC QN AUTO: 28.4 PG
MCHC RBC AUTO-ENTMCNC: 31.4 G/DL
MCV RBC AUTO: 90 FL
MONOCYTES # BLD AUTO: 0.6 K/UL
MONOCYTES NFR BLD: 11 %
NEUTROPHILS # BLD AUTO: 3 K/UL
NEUTROPHILS NFR BLD: 59.2 %
NRBC BLD-RTO: 0 /100 WBC
PLATELET # BLD AUTO: 208 K/UL
PMV BLD AUTO: 12 FL
RBC # BLD AUTO: 4.23 M/UL
WBC # BLD AUTO: 5.11 K/UL

## 2019-01-28 PROCEDURE — 76801 OB US < 14 WKS SINGLE FETUS: CPT | Mod: 26,S$PBB,, | Performed by: OBSTETRICS & GYNECOLOGY

## 2019-01-28 PROCEDURE — 85025 COMPLETE CBC W/AUTO DIFF WBC: CPT

## 2019-01-28 PROCEDURE — 99214 PR OFFICE/OUTPT VISIT, EST, LEVL IV, 30-39 MIN: ICD-10-PCS | Mod: TH,S$PBB,, | Performed by: ADVANCED PRACTICE MIDWIFE

## 2019-01-28 PROCEDURE — 99214 OFFICE O/P EST MOD 30 MIN: CPT | Mod: TH,S$PBB,, | Performed by: ADVANCED PRACTICE MIDWIFE

## 2019-01-28 PROCEDURE — 86703 HIV-1/HIV-2 1 RESULT ANTBDY: CPT

## 2019-01-28 PROCEDURE — 86592 SYPHILIS TEST NON-TREP QUAL: CPT

## 2019-01-28 PROCEDURE — 86762 RUBELLA ANTIBODY: CPT

## 2019-01-28 PROCEDURE — 99999 PR PBB SHADOW E&M-EST. PATIENT-LVL II: ICD-10-PCS | Mod: PBBFAC,,, | Performed by: ADVANCED PRACTICE MIDWIFE

## 2019-01-28 PROCEDURE — 76801 PR US, OB <14WKS, TRANSABD, SINGLE GESTATION: ICD-10-PCS | Mod: 26,S$PBB,, | Performed by: OBSTETRICS & GYNECOLOGY

## 2019-01-28 PROCEDURE — 99999 PR PBB SHADOW E&M-EST. PATIENT-LVL II: CPT | Mod: PBBFAC,,, | Performed by: ADVANCED PRACTICE MIDWIFE

## 2019-01-28 PROCEDURE — 86901 BLOOD TYPING SEROLOGIC RH(D): CPT

## 2019-01-28 PROCEDURE — 87491 CHLMYD TRACH DNA AMP PROBE: CPT

## 2019-01-28 PROCEDURE — 76801 OB US < 14 WKS SINGLE FETUS: CPT | Mod: PBBFAC | Performed by: OBSTETRICS & GYNECOLOGY

## 2019-01-28 PROCEDURE — 87340 HEPATITIS B SURFACE AG IA: CPT

## 2019-01-28 PROCEDURE — 36415 COLL VENOUS BLD VENIPUNCTURE: CPT

## 2019-01-28 PROCEDURE — 99212 OFFICE O/P EST SF 10 MIN: CPT | Mod: PBBFAC,25,TH | Performed by: ADVANCED PRACTICE MIDWIFE

## 2019-01-29 PROBLEM — O10.019 HYPERTENSION IN PREGNANCY, ESSENTIAL: Status: ACTIVE | Noted: 2019-01-29

## 2019-01-29 PROBLEM — O98.511 HSV-2 INFECTION COMPLICATING PREGNANCY, FIRST TRIMESTER: Status: ACTIVE | Noted: 2019-01-29

## 2019-01-29 PROBLEM — B00.9 HSV-2 INFECTION COMPLICATING PREGNANCY, FIRST TRIMESTER: Status: ACTIVE | Noted: 2019-01-29

## 2019-01-29 LAB
HBV SURFACE AG SERPL QL IA: NEGATIVE
HIV 1+2 AB+HIV1 P24 AG SERPL QL IA: NEGATIVE
RPR SER QL: NORMAL
RUBV IGG SER-ACNC: 49.9 IU/ML
RUBV IGG SER-IMP: REACTIVE

## 2019-01-29 NOTE — PROGRESS NOTES
CHIEF COMPLAINT:   Patient presents with     new pregnancy      HISTORY OF PRESENT ILLNESS  Sima Pham 38 y.o.  presents for initial OB appt  The patient has no complaints today. HTN was on meds, none in the past month. Will continue watching. Baby ASA at 15 wks. Some nausea or vomiting. No bleeding or pain.  Pregnancy was planned but is desired.  Partner is supportive of pregnancy, present at visit today.  Lives at home with father of baby.  A dog at home.  Works in a clerical contracted job.  Denies domestic abuse.  Denies chemical/pesticide/radiation exposure.    OB history:    LMP: Patient's last menstrual period was 12/10/2018.  EDC: Estimated Date of Delivery: 19  EGA: 7w1d       Health Maintenance   Topic Date Due    Lipid Panel  1981    TETANUS VACCINE  1999    Influenza Vaccine  2018    Pap Smear with HPV Cotest  2021       Past Medical History:   Diagnosis Date    Herpes simplex virus (HSV) infection     Hypertension        Past Surgical History:   Procedure Laterality Date    DILATION AND CURETTAGE OF UTERUS         Family History   Problem Relation Age of Onset    Heart disease Mother     Hypertension Father     Lung cancer Paternal Aunt     Diabetes Paternal Aunt     Hypertension Paternal Grandmother     Diabetes Cousin     Stroke Neg Hx     Kidney disease Neg Hx     Breast cancer Neg Hx     Colon cancer Neg Hx     Ovarian cancer Neg Hx        Social History     Socioeconomic History    Marital status: Single     Spouse name: None    Number of children: 0    Years of education: None    Highest education level: None   Social Needs    Financial resource strain: None    Food insecurity - worry: None    Food insecurity - inability: None    Transportation needs - medical: None    Transportation needs - non-medical: None   Occupational History    None   Tobacco Use    Smoking status: Never Smoker    Smokeless tobacco: Never Used    Substance and Sexual Activity    Alcohol use: Yes     Alcohol/week: 0.0 oz     Frequency: 2-4 times a month     Drinks per session: 1 or 2     Binge frequency: Never    Drug use: No    Sexual activity: Yes     Partners: Male     Birth control/protection: None   Other Topics Concern    None   Social History Narrative    None       Current Outpatient Medications   Medication Sig Dispense Refill    ELIDEL 1 % cream Apply 1 application topically 2 (two) times daily.  3    ketoconazole (NIZORAL) 2 % cream Apply 1 application topically 2 (two) times daily.  3    NIFEdipine (PROCARDIA-XL) 30 MG (OSM) 24 hr tablet TAKE 1 TABLET BY MOUTH EVERY DAY 30 tablet 0     No current facility-administered medications for this visit.        Review of patient's allergies indicates:  No Known Allergies      PHYSICAL EXAM   Vitals:    01/28/19 1026   BP: (!) 148/70        PAIN SCALE: 0/10 None    PHYSICAL EXAM    ROS:  GENERAL: No fever, chills, fatigability or weight loss.  CV: Denies chest pain  PULM: Denies shortness of breath or wheezing.  ABDOMEN: Appetite fine. No weight loss. Denies diarrhea, abdominal pain, hematemesis or blood in stool.  URINARY: No flank pain, dysuria or hematuria.  REPRODUCTIVE: No abnormal vaginal bleeding.       PE:   APPEARANCE: Well nourished, well developed, in no acute distress  CHEST: Clear to auscultation bilaterally  CV: Regular rate and rhythm  BREASTS: Symmetrical, no skin changes or visible lesions. No palpable masses, nipple discharge or adenopathy bilaterally.  ABDOMEN: Soft. No tenderness or masses. No hepatosplenomegaly. No hernias  PELVIC:   VULVA: No lesions. Normal female genitalia.  URETHRAL MEATUS: Normal size and location, no lesions, no prolapse.  URETHRA: No masses, tenderness, prolapse or scarring.  VAGINA: Moist and well rugated, no discharge, no significant cystocele or rectocele.  CERVIX: No lesions, normal diameter, no stenosis, no cervical motion tenderness.   UTERUS: 6  wk size, regular shape, mobile, non-tender, normal position, good support.  ADNEXA: No masses, tenderness or CDS nodularity.  ANUS PERINEUM: No lesions, no relaxation, no external hemorrhoids.     UPT +    A/P:     -      Patient was counseled today on A.C.S. Pap guidelines and recommendations for yearly pelvic exams, mammograms and monthly self breast exams; to see her PCP for other health maintenance and pregnancy.   -      Patient's medications and medical history reviewed with patient and implications in pregnancy.   -      Pregnancy course discussed and 'AtoZ' book given. Patient was counseled on proper weight gain based on the [place] [employer]'s recommendations based on her pre-pregnancy weight. Discussed foods to avoid in pregnancy (i.e. sushi, fish that are high in mercury, deli meat, and unpasteurized cheeses). Discussed prenatal vitamin options (i.e. stool softener, DHA). Discussed potential medical problems in pregnancy.  -     Discussed risk of Toxoplasmosis transmission from pets and reviewed risk reduction techniques.  -     Pt was counseled on exercise in pregnancy and weight gain recommendations.  -     Pt was counseled on travel recommendations and on risks of Zika virus exposure.  Current CDC Zika advisories and prevention techniques were reviewed with pt.  Pt denies any recent international travel and does not plan travel during pregnancy.  Pt reports that partner does not plan travel either.  -     Oriented to practice including CNM collaboration.   -     Follow-up initial OB, with labs and u/s today for dates  Desires screening, discussed NT scan and QMS. al

## 2019-01-30 LAB
C TRACH DNA SPEC QL NAA+PROBE: NOT DETECTED
N GONORRHOEA DNA SPEC QL NAA+PROBE: NOT DETECTED

## 2019-02-11 ENCOUNTER — TELEPHONE (OUTPATIENT)
Dept: OBSTETRICS AND GYNECOLOGY | Facility: HOSPITAL | Age: 39
End: 2019-02-11

## 2019-02-11 ENCOUNTER — PATIENT MESSAGE (OUTPATIENT)
Dept: OBSTETRICS AND GYNECOLOGY | Facility: CLINIC | Age: 39
End: 2019-02-11

## 2019-02-11 RX ORDER — PROMETHAZINE HYDROCHLORIDE 25 MG/1
25 TABLET ORAL EVERY 4 HOURS
Qty: 20 TABLET | Refills: 1 | Status: SHIPPED | OUTPATIENT
Start: 2019-02-11

## 2019-02-14 ENCOUNTER — PATIENT MESSAGE (OUTPATIENT)
Dept: OBSTETRICS AND GYNECOLOGY | Facility: CLINIC | Age: 39
End: 2019-02-14

## 2019-03-01 ENCOUNTER — PROCEDURE VISIT (OUTPATIENT)
Dept: OBSTETRICS AND GYNECOLOGY | Facility: CLINIC | Age: 39
End: 2019-03-01
Payer: MEDICAID

## 2019-03-01 ENCOUNTER — LAB VISIT (OUTPATIENT)
Dept: LAB | Facility: HOSPITAL | Age: 39
End: 2019-03-01
Payer: MEDICAID

## 2019-03-01 ENCOUNTER — ROUTINE PRENATAL (OUTPATIENT)
Dept: OBSTETRICS AND GYNECOLOGY | Facility: CLINIC | Age: 39
End: 2019-03-01
Payer: MEDICAID

## 2019-03-01 VITALS
SYSTOLIC BLOOD PRESSURE: 156 MMHG | BODY MASS INDEX: 38.78 KG/M2 | DIASTOLIC BLOOD PRESSURE: 92 MMHG | WEIGHT: 205.25 LBS

## 2019-03-01 DIAGNOSIS — O09.521 SUPERVISION OF ELDERLY MULTIGRAVIDA (>=35 YEARS OLD AT TIME OF DELIVERY), FIRST TRIMESTER: ICD-10-CM

## 2019-03-01 DIAGNOSIS — O34.11 LEIOMYOMA OF UTERUS AFFECTING PREGNANCY IN FIRST TRIMESTER: ICD-10-CM

## 2019-03-01 DIAGNOSIS — O09.521 SUPERVISION OF ELDERLY MULTIGRAVIDA (>=35 YEARS OLD AT TIME OF DELIVERY), FIRST TRIMESTER: Primary | ICD-10-CM

## 2019-03-01 DIAGNOSIS — Z36.9 ANTENATAL SCREENING ENCOUNTER: Primary | ICD-10-CM

## 2019-03-01 DIAGNOSIS — I10 HYPERTENSION, UNSPECIFIED TYPE: ICD-10-CM

## 2019-03-01 DIAGNOSIS — R35.0 URINARY FREQUENCY: ICD-10-CM

## 2019-03-01 DIAGNOSIS — D25.9 LEIOMYOMA OF UTERUS AFFECTING PREGNANCY IN FIRST TRIMESTER: ICD-10-CM

## 2019-03-01 LAB
ALBUMIN SERPL BCP-MCNC: 3.3 G/DL
ALP SERPL-CCNC: 61 U/L
ALT SERPL W/O P-5'-P-CCNC: 16 U/L
ANION GAP SERPL CALC-SCNC: 8 MMOL/L
AST SERPL-CCNC: 19 U/L
BILIRUB SERPL-MCNC: 0.6 MG/DL
BUN SERPL-MCNC: 6 MG/DL
CALCIUM SERPL-MCNC: 9.7 MG/DL
CHLORIDE SERPL-SCNC: 101 MMOL/L
CO2 SERPL-SCNC: 25 MMOL/L
CREAT SERPL-MCNC: 0.7 MG/DL
CREAT UR-MCNC: 102 MG/DL
EST. GFR  (AFRICAN AMERICAN): >60 ML/MIN/1.73 M^2
EST. GFR  (NON AFRICAN AMERICAN): >60 ML/MIN/1.73 M^2
ESTIMATED AVG GLUCOSE: 111 MG/DL
GLUCOSE SERPL-MCNC: 83 MG/DL
HBA1C MFR BLD HPLC: 5.5 %
POTASSIUM SERPL-SCNC: 4.1 MMOL/L
PROT SERPL-MCNC: 7.5 G/DL
PROT UR-MCNC: <7 MG/DL
PROT/CREAT UR: NORMAL MG/G{CREAT}
SODIUM SERPL-SCNC: 134 MMOL/L

## 2019-03-01 PROCEDURE — 83036 HEMOGLOBIN GLYCOSYLATED A1C: CPT

## 2019-03-01 PROCEDURE — 84156 ASSAY OF PROTEIN URINE: CPT

## 2019-03-01 PROCEDURE — 99213 OFFICE O/P EST LOW 20 MIN: CPT | Mod: TH,S$PBB,, | Performed by: ADVANCED PRACTICE MIDWIFE

## 2019-03-01 PROCEDURE — 99999 PR PBB SHADOW E&M-EST. PATIENT-LVL III: ICD-10-PCS | Mod: PBBFAC,,, | Performed by: ADVANCED PRACTICE MIDWIFE

## 2019-03-01 PROCEDURE — 99213 PR OFFICE/OUTPT VISIT, EST, LEVL III, 20-29 MIN: ICD-10-PCS | Mod: TH,S$PBB,, | Performed by: ADVANCED PRACTICE MIDWIFE

## 2019-03-01 PROCEDURE — 80053 COMPREHEN METABOLIC PANEL: CPT

## 2019-03-01 PROCEDURE — 99999 PR PBB SHADOW E&M-EST. PATIENT-LVL III: CPT | Mod: PBBFAC,,, | Performed by: ADVANCED PRACTICE MIDWIFE

## 2019-03-01 PROCEDURE — 36415 COLL VENOUS BLD VENIPUNCTURE: CPT

## 2019-03-01 PROCEDURE — 99213 OFFICE O/P EST LOW 20 MIN: CPT | Mod: PBBFAC,TH | Performed by: ADVANCED PRACTICE MIDWIFE

## 2019-03-01 PROCEDURE — 87086 URINE CULTURE/COLONY COUNT: CPT

## 2019-03-01 RX ORDER — ONDANSETRON 8 MG/1
8 TABLET, ORALLY DISINTEGRATING ORAL EVERY 6 HOURS PRN
Qty: 20 TABLET | Refills: 0 | Status: SHIPPED | OUTPATIENT
Start: 2019-03-01

## 2019-03-01 NOTE — LETTER
March 1, 2019      [facility]  [address] [facility] [address]  Phone: [phone]  Fax: [phone]       Patient: Sima Pham   YOB: 1980  Date of Visit: 03/01/2019    To Whom It May Concern:    Subha Pham  was at [facility] on 03/01/2019. Please allow her to have a handicapped parking tag.  She is pregnant and having difficulty with her legs at this time.  Thank you and please don't hesitate to call for any questions.      Sincerely,        Dang Viramontes CNM

## 2019-03-02 PROBLEM — D25.9 LEIOMYOMA OF UTERUS AFFECTING PREGNANCY IN FIRST TRIMESTER: Status: ACTIVE | Noted: 2019-03-02

## 2019-03-02 PROBLEM — O34.11 LEIOMYOMA OF UTERUS AFFECTING PREGNANCY IN FIRST TRIMESTER: Status: ACTIVE | Noted: 2019-03-02

## 2019-03-02 NOTE — PROGRESS NOTES
States is not taking her Procardia.  Fetal and maternal risks associated with uncontrolled BP discussed. Advised to take her procardia 30 XL as directed  Will RTC 1 week for BP check.  Also advised to initiate ASA 81 mg daily.  C/O nausea, A-Z booklet tips and dietary changes discussed.  Zofran sent.  C/O urinary frequency UA dip + for leukocytes and protein.  Urine C&S sent and P/C ratio baseline ordered.  Unable to obtain NT measurement on US today Maternity 21 ordered.  RTC 1 week

## 2019-03-03 LAB — BACTERIA UR CULT: NO GROWTH

## 2019-03-14 ENCOUNTER — PATIENT MESSAGE (OUTPATIENT)
Dept: OBSTETRICS AND GYNECOLOGY | Facility: CLINIC | Age: 39
End: 2019-03-14

## 2019-03-15 ENCOUNTER — TELEPHONE (OUTPATIENT)
Dept: OBSTETRICS AND GYNECOLOGY | Facility: CLINIC | Age: 39
End: 2019-03-15

## 2019-03-15 NOTE — TELEPHONE ENCOUNTER
----- Message from Estrella Sánchez sent at 3/15/2019  3:09 PM CDT -----  Contact: Nargis sanchez/[employer] (process for Lab [facility] )  (caller had different address for pt )Caller request call back regarding the maternity 21 test , faxed a pt identifier form 3/14 need f/u to start processing lab caller stated only the pts name was listed on tube ...call back: [phone] fax number: [phone]

## 2019-03-18 ENCOUNTER — PATIENT MESSAGE (OUTPATIENT)
Dept: OBSTETRICS AND GYNECOLOGY | Facility: CLINIC | Age: 39
End: 2019-03-18

## 2019-03-20 ENCOUNTER — TELEPHONE (OUTPATIENT)
Dept: OBSTETRICS AND GYNECOLOGY | Facility: CLINIC | Age: 39
End: 2019-03-20

## 2019-03-20 ENCOUNTER — PATIENT MESSAGE (OUTPATIENT)
Dept: OBSTETRICS AND GYNECOLOGY | Facility: CLINIC | Age: 39
End: 2019-03-20

## 2019-03-20 NOTE — TELEPHONE ENCOUNTER
Patient was called and let her know that, that I spoke to  Zachery at [employer], he will be faxing  The Mat 21 report today  Tf

## 2019-03-20 NOTE — TELEPHONE ENCOUNTER
Spoke to fiona again,  He said that he's not in the office and will fax as soon as he can,  Advised if he can have someone maybe fax results that's at the office,  He will call and have it sent over tf

## 2019-03-21 ENCOUNTER — PATIENT MESSAGE (OUTPATIENT)
Dept: OBSTETRICS AND GYNECOLOGY | Facility: CLINIC | Age: 39
End: 2019-03-21

## 2019-03-21 ENCOUNTER — DOCUMENTATION ONLY (OUTPATIENT)
Dept: OBSTETRICS AND GYNECOLOGY | Facility: CLINIC | Age: 39
End: 2019-03-21

## 2019-03-21 NOTE — PROGRESS NOTES
No mat 21 results yet,  I email Zachery again this AM at [facility],   but he came to the office to tell me that the lab only had one patient identifier on the specimen and unable to sign out at this time, he will be faxing out a sheet that we will be needs to give him the patient , along with patient name again. Out fax # was given again to zachery Tf

## 2019-03-22 ENCOUNTER — ROUTINE PRENATAL (OUTPATIENT)
Dept: OBSTETRICS AND GYNECOLOGY | Facility: CLINIC | Age: 39
End: 2019-03-22
Payer: MEDICAID

## 2019-03-22 VITALS — SYSTOLIC BLOOD PRESSURE: 130 MMHG | DIASTOLIC BLOOD PRESSURE: 68 MMHG | BODY MASS INDEX: 39.2 KG/M2 | WEIGHT: 207.44 LBS

## 2019-03-22 DIAGNOSIS — O09.521 SUPERVISION OF ELDERLY MULTIGRAVIDA (>=35 YEARS OLD AT TIME OF DELIVERY), FIRST TRIMESTER: ICD-10-CM

## 2019-03-22 DIAGNOSIS — I10 ESSENTIAL HYPERTENSION: ICD-10-CM

## 2019-03-22 DIAGNOSIS — Z36.3 ANTENATAL SCREENING FOR MALFORMATION USING ULTRASONICS: Primary | ICD-10-CM

## 2019-03-22 PROBLEM — R35.0 URINARY FREQUENCY: Status: RESOLVED | Noted: 2019-03-01 | Resolved: 2019-03-22

## 2019-03-22 PROCEDURE — 99999 PR PBB SHADOW E&M-EST. PATIENT-LVL III: CPT | Mod: PBBFAC,,, | Performed by: MIDWIFE

## 2019-03-22 PROCEDURE — 99212 PR OFFICE/OUTPT VISIT, EST, LEVL II, 10-19 MIN: ICD-10-PCS | Mod: TH,S$PBB,, | Performed by: MIDWIFE

## 2019-03-22 PROCEDURE — 99213 OFFICE O/P EST LOW 20 MIN: CPT | Mod: PBBFAC,TH | Performed by: MIDWIFE

## 2019-03-22 PROCEDURE — 99212 OFFICE O/P EST SF 10 MIN: CPT | Mod: TH,S$PBB,, | Performed by: MIDWIFE

## 2019-03-22 PROCEDURE — 99999 PR PBB SHADOW E&M-EST. PATIENT-LVL III: ICD-10-PCS | Mod: PBBFAC,,, | Performed by: MIDWIFE

## 2019-03-22 RX ORDER — ONDANSETRON 4 MG/1
8 TABLET, FILM COATED ORAL EVERY 8 HOURS PRN
Qty: 30 TABLET | Refills: 1 | Status: SHIPPED | OUTPATIENT
Start: 2019-03-22 | End: 2019-05-27 | Stop reason: SDUPTHER

## 2019-03-22 NOTE — PROGRESS NOTES
Having some nausea and vomiting during the day. States that the zofran helps and she would like more to be sent to her pharmacy. Sent Zofran 8mg PO to patient's pharmacy.  Taking benadryl nightly due to difficulty sleeping. She states that this helps.  Suggested vitamin B6 and Unisom as an alternative to help with sleep and nausea if she desires to try that instead. Reminded her that it is in her pregnancy A to Z booklet.  Taking procardia and baby ASA daily.  Will be getting Materniti 21 redrawn.  RTC in 5 weeks for anatomy US and prenatal visit or PRN.    GRISEL Barraza / LINDA Carter CNM

## 2019-03-28 ENCOUNTER — PATIENT MESSAGE (OUTPATIENT)
Dept: OBSTETRICS AND GYNECOLOGY | Facility: CLINIC | Age: 39
End: 2019-03-28

## 2019-04-01 ENCOUNTER — PATIENT MESSAGE (OUTPATIENT)
Dept: OBSTETRICS AND GYNECOLOGY | Facility: CLINIC | Age: 39
End: 2019-04-01

## 2019-04-09 ENCOUNTER — NURSE TRIAGE (OUTPATIENT)
Dept: ADMINISTRATIVE | Facility: CLINIC | Age: 39
End: 2019-04-09

## 2019-04-09 ENCOUNTER — PATIENT MESSAGE (OUTPATIENT)
Dept: OBSTETRICS AND GYNECOLOGY | Facility: CLINIC | Age: 39
End: 2019-04-09

## 2019-04-09 ENCOUNTER — TELEPHONE (OUTPATIENT)
Dept: OBSTETRICS AND GYNECOLOGY | Facility: CLINIC | Age: 39
End: 2019-04-09

## 2019-04-09 NOTE — TELEPHONE ENCOUNTER
Sent a message this am, had a pain over the top of her stomach this am x 2 that was severe in nature, lasted about a minute each time, occurred around 0800 this am, no more episodes since then, she is having back pain x 6 week, no worse today.  she is pregnant x 17 weeks, first pregnancy.  Unable to schedule with anyone at [facility]   [facility], will send high priority message    Reason for Disposition   Patient wants to be seen    Protocols used: PREGNANCY - ABDOMINAL PAIN LESS THAN 20 WEEKS EGA-A-OH

## 2019-04-09 NOTE — TELEPHONE ENCOUNTER
Spoke to patient patient states having sharp pain in top of stomach twice this morning and she is concerned. Pt. Denies any bleeding or burning with uriination. Made patient appointment for tomorrow at [facility] with [facility] Informed pt. Time and location, Pt. Voiced understanding.

## 2019-04-10 ENCOUNTER — ROUTINE PRENATAL (OUTPATIENT)
Dept: OBSTETRICS AND GYNECOLOGY | Facility: CLINIC | Age: 39
End: 2019-04-10
Payer: MEDICAID

## 2019-04-10 VITALS
BODY MASS INDEX: 39.03 KG/M2 | DIASTOLIC BLOOD PRESSURE: 72 MMHG | WEIGHT: 206.56 LBS | SYSTOLIC BLOOD PRESSURE: 138 MMHG

## 2019-04-10 DIAGNOSIS — O99.891 BACK PAIN AFFECTING PREGNANCY, ANTEPARTUM: Primary | ICD-10-CM

## 2019-04-10 DIAGNOSIS — M54.9 BACK PAIN AFFECTING PREGNANCY, ANTEPARTUM: Primary | ICD-10-CM

## 2019-04-10 PROCEDURE — 99213 OFFICE O/P EST LOW 20 MIN: CPT | Mod: PBBFAC,TH,PN | Performed by: ADVANCED PRACTICE MIDWIFE

## 2019-04-10 PROCEDURE — 99999 PR PBB SHADOW E&M-EST. PATIENT-LVL III: ICD-10-PCS | Mod: PBBFAC,,, | Performed by: ADVANCED PRACTICE MIDWIFE

## 2019-04-10 PROCEDURE — 99213 OFFICE O/P EST LOW 20 MIN: CPT | Mod: TH,S$PBB,, | Performed by: ADVANCED PRACTICE MIDWIFE

## 2019-04-10 PROCEDURE — 99999 PR PBB SHADOW E&M-EST. PATIENT-LVL III: CPT | Mod: PBBFAC,,, | Performed by: ADVANCED PRACTICE MIDWIFE

## 2019-04-10 PROCEDURE — 99213 PR OFFICE/OUTPT VISIT, EST, LEVL III, 20-29 MIN: ICD-10-PCS | Mod: TH,S$PBB,, | Performed by: ADVANCED PRACTICE MIDWIFE

## 2019-04-22 ENCOUNTER — CLINICAL SUPPORT (OUTPATIENT)
Dept: REHABILITATION | Facility: HOSPITAL | Age: 39
End: 2019-04-22
Attending: ADVANCED PRACTICE MIDWIFE
Payer: MEDICAID

## 2019-04-22 DIAGNOSIS — M54.50 LOW BACK PAIN DURING PREGNANCY, SECOND TRIMESTER: Primary | ICD-10-CM

## 2019-04-22 DIAGNOSIS — O26.892 LOW BACK PAIN DURING PREGNANCY, SECOND TRIMESTER: Primary | ICD-10-CM

## 2019-04-22 DIAGNOSIS — N39.41 URGE INCONTINENCE: ICD-10-CM

## 2019-04-22 PROCEDURE — 97110 THERAPEUTIC EXERCISES: CPT

## 2019-04-22 PROCEDURE — 97161 PT EVAL LOW COMPLEX 20 MIN: CPT

## 2019-04-22 NOTE — PROGRESS NOTES
OUTPATIENT PHYSICAL THERAPY EVALUATION        Patient: Sima Pham   [facility] #:  [id number]    Date of treatment: 04/22/2019   Time in: 1135  Time out: 1235  Billable time: 60 min  # Visits: 1  Auth: 20  POC expiration: 7/12/2019      HISTORY      Sima is a 38 y.o. female evaluated on 04/22/2019    Physician:  Meaghan Martinez CNM   Diagnosis: No diagnosis found.   Treatment ordered: Physical Therapy  Medical History:   Past Medical History:   Diagnosis Date    Herpes simplex virus (HSV) infection     Hypertension       Surgical History:   Past Surgical History:   Procedure Laterality Date    DILATION AND CURETTAGE OF UTERUS  2008      Medications:   Current Outpatient Medications   Medication Sig    ELIDEL 1 % cream Apply 1 application topically 2 (two) times daily.    ketoconazole (NIZORAL) 2 % cream Apply 1 application topically 2 (two) times daily.    NIFEdipine (PROCARDIA-XL) 30 MG (OSM) 24 hr tablet TAKE 1 TABLET BY MOUTH EVERY DAY    ondansetron (ZOFRAN) 4 MG tablet Take 2 tablets (8 mg total) by mouth every 8 (eight) hours as needed for Nausea.    ondansetron (ZOFRAN-ODT) 8 MG TbDL Take 1 tablet (8 mg total) by mouth every 6 (six) hours as needed.    PNV,calcium 72-iron-folic acid (PRENATAL VITAMIN PLUS LOW IRON) 27 mg iron- 1 mg Tab Take 1 tablet (1 each total) by mouth once daily.    prenatal vit calc,iron,folic (PRENATAL VITAMIN ORAL) Take 1 tablet by mouth once daily.    promethazine (PHENERGAN) 25 MG tablet Take 1 tablet (25 mg total) by mouth every 4 (four) hours.     No current facility-administered medications for this visit.        Allergies: Review of patient's allergies indicates:  No Known Allergies     Precautions: universal    OB/GYN History:  currently pregnant with first child    Bladder/Bowel History: urinary incontinence      SUBJECTIVE     Date of onset: Pt reports back pain for several weeks, incontinence noted to begin with this pregnancy.  History of current complaint: back  pain noted the past few weeks with pregnancy, pt currently 19 weeks, reports pain across back , worse with prolonged sitting and walking. She has to walk quite a distance with her computer for work, she obtained a handicap pass for the pregnancy but is hesitant to use it. The pain seems to be improving some, but still feels heaviness and discomfort if walking or prolonged sitting/standing. Incontinence began with episodes of vomiting due to pregnancy, now having milk leakage with getting to the bathroom and wears a panty liner. Pt reports plans for delivery include  if possible as she is afraid of the pain of delivery.   Patient's goals for therapy: reduce pain, get exercise program, improve incontinence.  Pain: Patient reports 6/10, with 0 being the lowest and 10 being the highest.    Activities that cause symptoms: strong urge to go, walking to the toilet, full bladder, vigorous activity or exercise, prolonged sitting and prolonged standing    Previous treatment included none    Sexually active? Yes, reports no pain with intercourse    Frequency of urination:   Daytime: Every 1-2 hours           Nighttime: at least once    Difficulty initiating urine stream: No  Urine stream: strong  Complete emptying: No  Bladder leakage: Yes  Frequency of incidents: Daily  Amount leaked (urine): drops to tsps    Frequency of bowel movements: once every 1-2 days  Difficulty initiating BM: No  Quality/Shape of BM: Chaplin Stool Chart 5  Colon leakage: No  Frequency of incidents: NA   Amount leaked (bowels): none  Form of protection: panty liner  Number of pads required in 24 hours: 2      Types of fluid intake: water and juice  Diet:varied  Current exercise:walking at work    Occupation: Pt works as a  and job-related duties include mostly computer work.    OBJECTIVE     ORTHO SCREEN  Posture: increased lumbar lordosis  Pelvic alignment: no sign of deviations noted in supine   Lumbar ROM:   Flexion 70% flata  "and pain reproduced  Extension: hinging noted at L2/3 mild pain  R SB: no pain  L SB: pulling on R    Tenderness noted through palpation of L2-5 paraspinals    Hip ROM: tight ER/Ir and hip flexors R>L    ABDOMINALS  Scarring: none  Diastasis: 1 fingers above umbilicus, 1 fingers at umbilicus, 1 fingers below umbilicus   Abdominal strength: Rectus: 4     TA: fair control  Chaperone: Pt declined exam  Consent signed: Yes    VAGINAL PELVIC FLOOR EXAM   Pt declined internal/external exam            TREATMENT      Therapeutic Exercise to develop  strength for 30 minutes including: thoracic rotation bilaterally, posterior pelvic tilts, tband "clam" exercises with cues for proper breathing, instruction on performing kegel        Education: instructed on general anatomy/physiology of urinary/bowel system; discussed plan of care with patient and parent/guardian; instructed in benefits/risks of treatment; instructed in alternative methods of treatment; instructed in risks of refusing treatment; patient a parent agreed to treatment plan.     Also educated in: bladder irritants, anatomy/physiology of pelvic floor, posture/body mechanices, kegels and fluid intake/dietary modifications    ASSESSMENT      This is a 38 y.o. female referred to outpatient physical therapy and presents with a medical diagnosis of back pain affecting pregnancy. Patient will benefit from skilled physical therapy to address pain and incontinence.      Educational/Spiritual/Cultural needs: none  Abuse/Neglect: no signs  Nutritional Status: WNL   Fall Risk: minimal    Pt's spiritual, cultural and educational needs considered and pt agreeable to plan of care and goals as stated below:     Medical necessity is demonstrated by the following IMPAIRMENTS/PROMBLEMS     History  Co-morbidities and personal factors that may impact the plan of care Examination  Body Structures and Functions, activity limitations and participation restrictions that may impact the " plan of care Clinical Presentation   Decision Making/ Complexity Score   Co-morbidities:                 Personal Factors:    Body Regions/Systems/Functions:    altered posture, poor trunk stability, decreased endurance of the pelvic muscles and dysfunctional voiding           Activity limitations:   Barriers to Learning: none  Environmental Barriers: none noted    Participation Restrictions:           low           PLAN    Frequency: 1x/week  Duration: 6  Short Term Goals: 3 weeks   Pt will report improved ability to perform ADLs (ie. dressing, bathing, functional transfers) with little (drops) to no urinary leakage 4/7 days per week.  Pt will verbalize improved awareness of PFM activity as palpated by PT in order to improve activity involvement with HEP.  Pt will report improved ability to cough/sneeze/laugh/yell with little (drops) to no urinary leakage 4/7 days per week.   Pt will report improved ability to tolerate standing > or = 1 hour with < or = 2/10 pain for improved ability to complete job duties.  Pt will report improved ability to tolerate sitting > or = 1 hour with < or = 2/10 pain for improved ability to complete job duties.   Pt/family will be independent with HEP for continued self-management of symptoms.    Long Term Goals: 6 weeks   Pt will report improved ability to cough/sneeze/laugh/yell with little (drops) to no urinary leakage 6/7 days per week.   Pt will report improved ability to tolerate standing > or = for work and household activities with < or = 2/10 pain for improved ability to complete normal daily tasks.  Pt will report improved ability to tolerate sitting > or = for work activities with < or = 2/10 pain for improved ability to complete job duties.     Physical therapy will include: therapeutic exercises, therapeutic activity, neuromuscular re-education, manual therapy, modalities PRN, patient/family education and self care/home management       Therapist: Lorena Rea,  PT  4/22/2019

## 2019-04-24 ENCOUNTER — PATIENT MESSAGE (OUTPATIENT)
Dept: REHABILITATION | Facility: HOSPITAL | Age: 39
End: 2019-04-24

## 2019-04-24 NOTE — PATIENT INSTRUCTIONS
Back Pain During Pregnancy: Positioning Yourself     When standing, resting a foot on a low block can ease back pain.     You likely position yourself differently now than you did before you were pregnant. Did you know that standing, sitting, or lying in certain ways can lead to back pain? To ease pain, use positions that support your body comfortably.   Tips for good posture  Using good posture means holding yourself so that your spine is aligned and your muscles can work without strain. To use good posture:  · Raise your chest and head. Try to keep your ears lined up over your shoulders.  · Use your stomach muscles to pull in your stomach. This reduces the amount of weight your back must support.  · Keep your pelvis level. Think of your pelvis as a bowl of water that will spill if it tips too far forward or backward.  Standing  If you must stand for long periods, try to change positions every 15 minutes. This gives your muscles a break. When standing, also:  · Keep your legs slightly apart. This helps you balance your weight.  · Rest one foot on a book, ledge, or low stool. Every few minutes, switch legs.  · Wear comfortable shoes with padded soles and arch support, like athletic shoes.  Sitting  When sitting in a chair or car, make sure your spines lumbar curve is supported. Use a chair with lumbar support built in, or put a firm pillow against your lower back. Also try the following:  · Sit with your knees slightly lower than your hips. Dont cross your legs.  · Take deep breaths often. This helps keep your spine and stomach in the best position.  · Vary your activity each hour. For instance, get up from your desk and take a 5-minute walk around the office.  Lying down  To lie safely and comfortably:  · Lie on your side with your knees slightly bent. This takes pressure off your uterus and improves blood flow to your baby.  · Place pillows under your abdomen and between your knees.  · To get out of bed, roll  onto your side. Use your arms to push yourself into a seated position. Scoot to the edge of the bed and place your feet on the floor. Lean forward, then use your leg muscles to stand.  · Consider investing in a firm mattress.  Lifting  Tip to safely lift:  ·  Do not bend over from the waist to pick things up-squat down, bend your knees, and keep your back straight.  Date Last Reviewed: 8/16/2015  © 0276-2093 [employer]. [address] [place] [address], [place] [address]. All rights reserved. This information is not intended as a substitute for professional medical care. Always follow your healthcare professional's instructions.

## 2019-04-29 ENCOUNTER — ROUTINE PRENATAL (OUTPATIENT)
Dept: OBSTETRICS AND GYNECOLOGY | Facility: CLINIC | Age: 39
End: 2019-04-29
Payer: MEDICAID

## 2019-04-29 ENCOUNTER — PROCEDURE VISIT (OUTPATIENT)
Dept: OBSTETRICS AND GYNECOLOGY | Facility: CLINIC | Age: 39
End: 2019-04-29
Payer: MEDICAID

## 2019-04-29 VITALS
BODY MASS INDEX: 39.41 KG/M2 | SYSTOLIC BLOOD PRESSURE: 124 MMHG | DIASTOLIC BLOOD PRESSURE: 76 MMHG | WEIGHT: 208.56 LBS

## 2019-04-29 DIAGNOSIS — O09.529 AMA (ADVANCED MATERNAL AGE) MULTIGRAVIDA 35+, UNSPECIFIED TRIMESTER: ICD-10-CM

## 2019-04-29 DIAGNOSIS — Z36.3 ANTENATAL SCREENING FOR MALFORMATION USING ULTRASONICS: ICD-10-CM

## 2019-04-29 DIAGNOSIS — I10 HYPERTENSION, UNSPECIFIED TYPE: Primary | ICD-10-CM

## 2019-04-29 PROCEDURE — 99213 OFFICE O/P EST LOW 20 MIN: CPT | Mod: PBBFAC,PN,TH,25 | Performed by: ADVANCED PRACTICE MIDWIFE

## 2019-04-29 PROCEDURE — 76805 OB US >/= 14 WKS SNGL FETUS: CPT | Mod: PBBFAC,PN | Performed by: OBSTETRICS & GYNECOLOGY

## 2019-04-29 PROCEDURE — 99999 PR PBB SHADOW E&M-EST. PATIENT-LVL III: ICD-10-PCS | Mod: PBBFAC,,, | Performed by: ADVANCED PRACTICE MIDWIFE

## 2019-04-29 PROCEDURE — 99999 PR PBB SHADOW E&M-EST. PATIENT-LVL III: CPT | Mod: PBBFAC,,, | Performed by: ADVANCED PRACTICE MIDWIFE

## 2019-04-29 PROCEDURE — 76805 PR US, OB 14+WKS, TRANSABD, SINGLE GESTATION: ICD-10-PCS | Mod: 26,S$PBB,, | Performed by: OBSTETRICS & GYNECOLOGY

## 2019-04-29 PROCEDURE — 99213 OFFICE O/P EST LOW 20 MIN: CPT | Mod: TH,S$PBB,, | Performed by: ADVANCED PRACTICE MIDWIFE

## 2019-04-29 PROCEDURE — 76805 OB US >/= 14 WKS SNGL FETUS: CPT | Mod: 26,S$PBB,, | Performed by: OBSTETRICS & GYNECOLOGY

## 2019-04-29 PROCEDURE — 99213 PR OFFICE/OUTPT VISIT, EST, LEVL III, 20-29 MIN: ICD-10-PCS | Mod: TH,S$PBB,, | Performed by: ADVANCED PRACTICE MIDWIFE

## 2019-04-29 NOTE — PROGRESS NOTES
Normotensive on Procardia 30 mg XL and baby aspirin-doing well  Anatomy ultrasound today, vertex, posterior placenta, three-vessel cord, normal fluid, EFW 12 oz, normal female anatomy with sub heart will followup view next visit.    Patient viewed [employer] video, Learn Your Baby and was provided with [facility] handouts: Baby Led Feeding and Rooming In. Discussed benefits of rooming-in 24 hours a day, benefits of cue-based feeding, the impact of feeding frequency on milk supply, and basic breastfeeding management. Encouraged patient to [facility] and to download the [employer] mobile pasquale if she has not already done so. Patient verbalizes understanding.

## 2019-04-29 NOTE — PATIENT INSTRUCTIONS
Pregnancy: Your Second Trimester Changes    Each day, you and your baby are changing and growing together. Heres a quick look at whats happening to both of you.  How You Are Changing  Even when you dont notice it, your body is adapting to meet the needs of your growing baby. The changes in your body might also affect your moods.  Your body  Your uterus expands as baby grows. As the weeks go by, you will feel more pressure on your bladder, stomach, and other organs. You may notice some skin color changes on your forehead, nose, or cheeks. Freckles may darken, and moles may grow. You may notice a darker line on your abdomen between your belly button and pubic bone in the midline.  Your moods  The second trimester is often easier than the first. Still, be prepared for mood swings. These are due to the increase in hormones (chemicals that affect the way organs work) produced by your body. These mood swings are a normal part of pregnancy.  How your baby is growing       Month 4  Babys heartbeat may be heard with a Doppler (hand-held ultrasound device) by 9 to 10 weeks. Eyebrows, eyelashes and fingernails begin to form.  Month 5  You may feel your baby move. After a growth spurt, your baby nears 10 inches. Month 6  Babys fingerprints have formed. Your baby weighs about 1  to 2 pounds and is about 12 inches long.   Date Last Reviewed: 8/16/2015  © 6845-8440 [employer]. [address] [place] [address], [place] [address]. All rights reserved. This information is not intended as a substitute for professional medical care. Always follow your healthcare professional's instructions.        Adapting to Pregnancy: Second Trimester  Keep up the healthy habits you started in your first trimester. You might be a little more tired than normal. So plan your day wisely. Look at the tips below and choose the ones that suit your lifestyle.    If you have any questions, check with your healthcare provider.   If you work  If  you can, adjust your work with your employer to fit your needs. Try these tips:  · If you stand for long periods, find ways to do some tasks while sitting. Also, try to stand with 1 foot resting on a low stool or ledge. Shift your weight from foot to foot often. Wear low-heeled shoes.  · If you sit, keep your knees level with your hips. Rest your feet on a firm surface. Sit tall with support for your low back.  · If you work long hours, ask about adjusting your schedule. Try taking shorter breaks more often.  When you travel  The second trimester may be the best time for any travel. Talk to your healthcare provider about any special plans you may need to make. Always:  · Wear a seat belt. Fasten the lap part under your belly. Wear the shoulder part also.  · Take breaks often during long trips by car or plane. Move around to stretch your legs.  · Drink plenty of fluids on flights. The air in plane cabins is very dry.  · Avoid hot climates or high altitudes if you are not used to them.  · Avoid places where the food and water might make you sick.  · Make sure you are up-to-date on all immunizations, including the flu vaccine. This is especially important when traveling overseas.  Taking time to relax  Find time to rest and relax at work or at home:  · Take short time-outs daily. Do relaxation exercises.  · Breathe deeply during stressful times.  · Try not to take on too much. Plan tasks for times when you have the most energy.  · Take naps when you can. Or just sit and relax.  · After week 16, avoid lying on your back for more than a few minutes. Instead, lie on your side. Switch sides often.  Continuing as lovers  Unless your healthcare provider tells you otherwise, there is no reason to stop having sex now. Blood supply increases to the pelvic area in the second trimester. Because of this, sex might be more enjoyable. Try different positions and see whats best. Also, talk to your partner about any changes in  desire. Spotting may happen after sex. Be sure to let your healthcare provider know if there is heavy bleeding.  Keeping your environment safe  You can still clean house and use scented products. Just take some simple precautions:  · Wear gloves when using cleaning fluids.  · Open windows to let in fresh air. Use a fan if you paint.  · Avoid secondhand smoke.  · Dont breathe fumes from nail polish, hair spray, cleansers, or other chemicals.  Date Last Reviewed: 8/16/2015  © 8119-3732 [employer]. [address] [place] [address]. All rights reserved. This information is not intended as a substitute for professional medical care. Always follow your healthcare professional's instructions.

## 2019-04-30 NOTE — PROCEDURES
Procedures     Please see ultrasound in viewpoint. Report is in imaging section of chart review.

## 2019-05-01 ENCOUNTER — CLINICAL SUPPORT (OUTPATIENT)
Dept: REHABILITATION | Facility: HOSPITAL | Age: 39
End: 2019-05-01
Attending: ADVANCED PRACTICE MIDWIFE
Payer: MEDICAID

## 2019-05-01 DIAGNOSIS — M54.50 LOW BACK PAIN DURING PREGNANCY, SECOND TRIMESTER: Primary | ICD-10-CM

## 2019-05-01 DIAGNOSIS — O26.892 LOW BACK PAIN DURING PREGNANCY, SECOND TRIMESTER: Primary | ICD-10-CM

## 2019-05-01 DIAGNOSIS — N39.41 URGE INCONTINENCE: ICD-10-CM

## 2019-05-01 PROCEDURE — 97140 MANUAL THERAPY 1/> REGIONS: CPT

## 2019-05-01 PROCEDURE — 97110 THERAPEUTIC EXERCISES: CPT

## 2019-05-01 NOTE — PROGRESS NOTES
"  OUTPATIENT PHYSICAL THERAPY DAILY NOTE       Patient: Sima Pham   [facility] #:  [id number]    Diagnosis:   Encounter Diagnoses   Name Primary?    Low back pain during pregnancy, second trimester Yes    Urge incontinence       Date of treatment: 05/01/2019     Time in: 0840  Time out: 925  Billable time: 45  Visit #: 2  Auth: 20  POC expiration: 07/12/2019    SUBJECTIVE   Pt reports: Pain has been less, she has been doing exercies and still notes some pain with standing and walking after long periods.  Pain: 2/10 on VAS scale  Pain location: R side low back    OBJECTIVE     Therapeutic Exercise to develop  strength, endurance and core stabilization for 30 minutes including:   recumbent bike x 3.5 min, elliptical x 2 min, leg press double 4 bands, single 2 bands, mini squats at // bars, tband hip abd, ext, on mat: lower trunk rotations, sidelying "clam" hip abd, diaphragmatic breathing x 5 min all with verbal and tactile cues for proper performance.     Manual Therapy to develop flexibility for 15 minutes including: gentle opening lspine 2-5, STM quadratus, hip rotators, glutes      Education: Discussed progression of plan of care with patient; educated pt in activity modification; reviewed HEP with pt. Pt demonstrated and verbalized understanding of all instruction and was not provided with a handout of HEP (see Patient Instructions).      ASSESSMENT      Pt tolerated entire treatment today with no visible adverse effects. Pt did note some pain with standing tband exercises but improved after manual treatment.   Will the patient continue to benefit from skilled PT intervention? Yes  Medical necessity demonstrated by: continued pain in pelvis/back  Progress towards goals:  excellent  New/Revised goals: none      PLAN     Continue with established Plan of Care, working toward established PT goals.   "

## 2019-05-20 ENCOUNTER — CLINICAL SUPPORT (OUTPATIENT)
Dept: REHABILITATION | Facility: HOSPITAL | Age: 39
End: 2019-05-20
Attending: ADVANCED PRACTICE MIDWIFE
Payer: MEDICAID

## 2019-05-20 DIAGNOSIS — O26.892 LOW BACK PAIN DURING PREGNANCY, SECOND TRIMESTER: Primary | ICD-10-CM

## 2019-05-20 DIAGNOSIS — N39.41 URGE INCONTINENCE: ICD-10-CM

## 2019-05-20 DIAGNOSIS — M54.50 LOW BACK PAIN DURING PREGNANCY, SECOND TRIMESTER: Primary | ICD-10-CM

## 2019-05-20 PROCEDURE — 97112 NEUROMUSCULAR REEDUCATION: CPT | Performed by: PHYSICAL THERAPIST

## 2019-05-20 PROCEDURE — 97140 MANUAL THERAPY 1/> REGIONS: CPT | Performed by: PHYSICAL THERAPIST

## 2019-05-20 PROCEDURE — 97535 SELF CARE MNGMENT TRAINING: CPT | Performed by: PHYSICAL THERAPIST

## 2019-05-20 NOTE — PROGRESS NOTES
OUTPATIENT PHYSICAL THERAPY DAILY NOTE       Patient: Sima Pham   [facility] #:  [id number]    Diagnosis:   Encounter Diagnoses   Name Primary?    Low back pain during pregnancy, second trimester Yes    Urge incontinence       Date of treatment: 05/20/2019     Time in: 0840  Time out: 925  Billable time: 45  Visit #: 3  Auth: 20  POC expiration: 07/12/2019    SUBJECTIVE   Pt reports: sitting at work for long periods of time increases B low back pain. ALso, walking increases my LBP. I have not used my stability belt because I do not like the way it feels. I have to wake often at night to use the restroom  Pain: 2/10 on VAS scale  Pain location: B side low back    OBJECTIVE     Therapeutic Exercise to develop  strength, endurance and core stabilization for 30 minutes including:   SB PPT 30 reps, SB figure 8 30 reps, SB hip add with ball 30 reps, SB with hip abd 30 reps with blue TB, gluteal squeezes 20 reps, kegel quick flicks on SB 20 reps, SB marches 30 reps  Manual Therapy to develop flexibility for 15 minutes including: gentle opening lspine 2-5, STM quadratus, hip rotators, glutes      Education:15 min:  Discussed progression of plan of care with patient; educated pt in activity modification; reviewed HEP with pt. Pt demonstrated and verbalized understanding of all instruction and was not provided with a handout of HEP (see Patient Instructions). Pt educated on ergonomics at work and support for lower back in chair. Use of stability belt during the day, importance of sidelying at night, bladder irritants and importance of HEP      ASSESSMENT      Pt tolerated tx well. Will bring belt in next visit to make sure proper fit, will also be looking into better footwear and using support at desk.  Medical necessity demonstrated by: continued pain in pelvis/back  Progress towards goals:  excellent  New/Revised goals: none      PLAN     Continue with established Plan of Care, working toward established PT goals.

## 2019-05-27 ENCOUNTER — CLINICAL SUPPORT (OUTPATIENT)
Dept: REHABILITATION | Facility: HOSPITAL | Age: 39
End: 2019-05-27
Attending: ADVANCED PRACTICE MIDWIFE
Payer: MEDICAID

## 2019-05-27 DIAGNOSIS — M54.50 LOW BACK PAIN DURING PREGNANCY, SECOND TRIMESTER: Primary | ICD-10-CM

## 2019-05-27 DIAGNOSIS — N39.41 URGE INCONTINENCE: ICD-10-CM

## 2019-05-27 DIAGNOSIS — O26.892 LOW BACK PAIN DURING PREGNANCY, SECOND TRIMESTER: Primary | ICD-10-CM

## 2019-05-27 PROCEDURE — 97110 THERAPEUTIC EXERCISES: CPT

## 2019-05-27 RX ORDER — ONDANSETRON 4 MG/1
8 TABLET, FILM COATED ORAL EVERY 8 HOURS PRN
Qty: 30 TABLET | Refills: 1 | Status: SHIPPED | OUTPATIENT
Start: 2019-05-27 | End: 2020-05-26

## 2019-05-27 NOTE — PROGRESS NOTES
OUTPATIENT PHYSICAL THERAPY DAILY NOTE       Patient: Sima Pham   [facility] #:  [id number]    Diagnosis:   Encounter Diagnoses   Name Primary?    Low back pain during pregnancy, second trimester Yes    Urge incontinence       Date of treatment: 05/27/2019     Time in: 1155   Time out: 1215  Billable time: 20  Visit #: 4  Auth: 20  POC expiration: 07/12/2019    SUBJECTIVE   Pt reports: My back pain is getting better, not nearly as much as it was and I found I can wear half of the belt which is helping. I am late today and can't stay long because my grandmother is in the ER. Since I started doing the quick kegels I am having less times going to the bathroom at night.   Pain: 2/10 on VAS scale  Pain location: B side low back    OBJECTIVE     Therapeutic Exercise to develop  strength, endurance and core stabilization for 15 minutes including:   SB PPT 30 reps, SB figure 8 30 reps, SB hip add with ball 30 reps, , gluteal squeezes 20 reps, kegel quick flicks on SB 20 reps, SB marches 30 reps  Manual Therapy to develop flexibility for 5 minutes including: gentle opening lspine 2-5, STM quadratus, hip rotators, glutes      Education:  Discussed progression of plan of care with patient; educated pt in activity modification; reviewed HEP with pt. Pt demonstrated and verbalized understanding of all instruction and was not provided with a handout of HEP (see Patient Instructions). Pt educated on ergonomics at work and support for lower back in chair. Use of stability belt during the day, importance of sidelying at night, bladder irritants and importance of HEP      ASSESSMENT      Pt with decreased night urinary frequency enabling better quality sleep, progressing with lumbar stabilization on ball, and since more compliant with her brace she has progressed with pain.   Medical necessity demonstrated by: continued pain in pelvis/back  Progress towards goals:  excellent  New/Revised goals: none      PLAN     Continue with  established Plan of Care, working toward established PT goals.

## 2019-06-05 ENCOUNTER — ROUTINE PRENATAL (OUTPATIENT)
Dept: OBSTETRICS AND GYNECOLOGY | Facility: CLINIC | Age: 39
End: 2019-06-05
Payer: MEDICAID

## 2019-06-05 ENCOUNTER — LAB VISIT (OUTPATIENT)
Dept: LAB | Facility: HOSPITAL | Age: 39
End: 2019-06-05
Payer: MEDICAID

## 2019-06-05 ENCOUNTER — PROCEDURE VISIT (OUTPATIENT)
Dept: OBSTETRICS AND GYNECOLOGY | Facility: CLINIC | Age: 39
End: 2019-06-05
Payer: MEDICAID

## 2019-06-05 VITALS
DIASTOLIC BLOOD PRESSURE: 76 MMHG | BODY MASS INDEX: 40.07 KG/M2 | WEIGHT: 212.06 LBS | SYSTOLIC BLOOD PRESSURE: 124 MMHG

## 2019-06-05 DIAGNOSIS — O09.529 AMA (ADVANCED MATERNAL AGE) MULTIGRAVIDA 35+, UNSPECIFIED TRIMESTER: ICD-10-CM

## 2019-06-05 DIAGNOSIS — I10 HYPERTENSION, UNSPECIFIED TYPE: ICD-10-CM

## 2019-06-05 DIAGNOSIS — O09.529 AMA (ADVANCED MATERNAL AGE) MULTIGRAVIDA 35+, UNSPECIFIED TRIMESTER: Primary | ICD-10-CM

## 2019-06-05 LAB
BASOPHILS # BLD AUTO: 0.03 K/UL (ref 0–0.2)
BASOPHILS NFR BLD: 0.4 % (ref 0–1.9)
DIFFERENTIAL METHOD: ABNORMAL
EOSINOPHIL # BLD AUTO: 0 K/UL (ref 0–0.5)
EOSINOPHIL NFR BLD: 0.4 % (ref 0–8)
ERYTHROCYTE [DISTWIDTH] IN BLOOD BY AUTOMATED COUNT: 14.6 % (ref 11.5–14.5)
GLUCOSE SERPL-MCNC: 140 MG/DL (ref 70–140)
HCT VFR BLD AUTO: 34 % (ref 37–48.5)
HGB BLD-MCNC: 10.7 G/DL (ref 12–16)
IMM GRANULOCYTES # BLD AUTO: 0.08 K/UL (ref 0–0.04)
IMM GRANULOCYTES NFR BLD AUTO: 0.9 % (ref 0–0.5)
LYMPHOCYTES # BLD AUTO: 1.5 K/UL (ref 1–4.8)
LYMPHOCYTES NFR BLD: 17 % (ref 18–48)
MCH RBC QN AUTO: 30.5 PG (ref 27–31)
MCHC RBC AUTO-ENTMCNC: 31.5 G/DL (ref 32–36)
MCV RBC AUTO: 97 FL (ref 82–98)
MONOCYTES # BLD AUTO: 0.5 K/UL (ref 0.3–1)
MONOCYTES NFR BLD: 5.4 % (ref 4–15)
NEUTROPHILS # BLD AUTO: 6.5 K/UL (ref 1.8–7.7)
NEUTROPHILS NFR BLD: 75.9 % (ref 38–73)
NRBC BLD-RTO: 0 /100 WBC
PLATELET # BLD AUTO: 178 K/UL (ref 150–350)
PMV BLD AUTO: 11.7 FL (ref 9.2–12.9)
RBC # BLD AUTO: 3.51 M/UL (ref 4–5.4)
WBC # BLD AUTO: 8.52 K/UL (ref 3.9–12.7)

## 2019-06-05 PROCEDURE — 99999 PR PBB SHADOW E&M-EST. PATIENT-LVL III: CPT | Mod: PBBFAC,,, | Performed by: ADVANCED PRACTICE MIDWIFE

## 2019-06-05 PROCEDURE — 76816 OB US FOLLOW-UP PER FETUS: CPT | Mod: PBBFAC | Performed by: OBSTETRICS & GYNECOLOGY

## 2019-06-05 PROCEDURE — 99213 OFFICE O/P EST LOW 20 MIN: CPT | Mod: PBBFAC,TH,25 | Performed by: ADVANCED PRACTICE MIDWIFE

## 2019-06-05 PROCEDURE — 85025 COMPLETE CBC W/AUTO DIFF WBC: CPT

## 2019-06-05 PROCEDURE — 76816 PR  US,PREGNANT UTERUS,F/U,TRANSABD APP: ICD-10-PCS | Mod: 26,S$PBB,, | Performed by: OBSTETRICS & GYNECOLOGY

## 2019-06-05 PROCEDURE — 82950 GLUCOSE TEST: CPT

## 2019-06-05 PROCEDURE — 86592 SYPHILIS TEST NON-TREP QUAL: CPT

## 2019-06-05 PROCEDURE — 86703 HIV-1/HIV-2 1 RESULT ANTBDY: CPT

## 2019-06-05 PROCEDURE — 36415 COLL VENOUS BLD VENIPUNCTURE: CPT

## 2019-06-05 PROCEDURE — 76816 OB US FOLLOW-UP PER FETUS: CPT | Mod: 26,S$PBB,, | Performed by: OBSTETRICS & GYNECOLOGY

## 2019-06-05 PROCEDURE — 99213 OFFICE O/P EST LOW 20 MIN: CPT | Mod: TH,S$PBB,, | Performed by: ADVANCED PRACTICE MIDWIFE

## 2019-06-05 PROCEDURE — 99213 PR OFFICE/OUTPT VISIT, EST, LEVL III, 20-29 MIN: ICD-10-PCS | Mod: TH,S$PBB,, | Performed by: ADVANCED PRACTICE MIDWIFE

## 2019-06-05 PROCEDURE — 99999 PR PBB SHADOW E&M-EST. PATIENT-LVL III: ICD-10-PCS | Mod: PBBFAC,,, | Performed by: ADVANCED PRACTICE MIDWIFE

## 2019-06-05 NOTE — PROGRESS NOTES
Normotensive with Procardia 30 mg XL.    Ultrasound today vertex, posterior placenta, three-vessel cord, normal fluid, EFW 1 lb 13 oz at 51 percentile space four-chamber heart seen appears to be normal still unable to visualize RV OT will follow up next visit  Not sure regarding birth control, considering BTL.  Consider Tdap next visit.        Patient viewed [employer] video, Protect Breastfeeding and was provided with [employer] handout: Risks of Formula Feeding. Discussed importance of exclusive breastfeeding for the first 6 months and continuing to breastfeed after the introduction of complementary foods, risks of supplementation, benefits of feeding on demand, the impact of feeding frequency on milk supply, and basic management of breastfeeding. Encouraged patient to attend [facility]s Prenatal Breastfeeding Class and to download the [employer] mobile pasquale if she has not already done so. Patient verbalizes understanding.

## 2019-06-05 NOTE — PATIENT INSTRUCTIONS
Pregnancy: Your Third Trimester Changes  As the baby grows, your body changes too. You may also see signs that your body is getting ready for labor. Be patient. Within a few more weeks, your baby will be born.    How you are changing  Your body is preparing for the birth of your baby. Some of the most common changes are listed below. If you have any questions or concerns, ask your healthcare provider:  · Youll gain more weight from fluids, extra blood, and fat deposits.  · Your breasts will grow as your body gets ready to feed the baby. They may be more tender. You may also notice a slight yellow or white discharge from the nipples.  · Discharge from your vagina may increase. This is normal.  · You might see some skin color changes on your forehead, cheeks, or nose. Most of these will go away after you deliver.  How your baby is growing    Month 7  Your baby can open and close his or her eyes, and weighs around 4 pounds. If born prematurely (too early), your baby would likely survive with special care.   Month 8  Your baby is building up body fat, and weighs around 6 pounds.    Month 9  Your baby weighs nearly 7 pounds and is about 18 to 20 inches long. In other words, any day now...   Date Last Reviewed: 8/16/2015 © 2000-2017 [employer]. [address] [place] [address], [place] [address]. All rights reserved. This information is not intended as a substitute for professional medical care. Always follow your healthcare professional's instructions.        Adapting to Pregnancy: Third Trimester    Although common during pregnancy, some discomforts may seem worse in the final weeks. Simple lifestyle changes can help. Take care of yourself. And ask your partner to help out with small tasks.  Limiting leg problems  Ways to combat leg issues:  · Wear support hose all day.  · Avoid snug shoes and clothes that bind, like tight pants and socks with elastic tops.  · Sit with your feet and legs raised often.  Caring  for your breasts  Tips to follow include:  · Wash with plain water. Avoid using harsh soaps or rubbing alcohol. They may cause dryness.  · Wear a nursing bra for extra support. It can also hide any leaks from your nipples.  Controlling hemorrhoids  Ways to avoid hemorrhoids include:  · Eat foods that are high in fiber. Also, exercise and drink enough fluids. This will reduce constipation and hemorrhoids.  · Sleep and nap on your side. This limits pressure on the veins of your rectum.  · Try not to stand or sit for long periods.  Controlling back pain  As your body changes during pregnancy, your back must work in new ways. Back pain is due to many causes. Physical changes in your body can strain your back and its supporting muscles. Also, hormones (chemicals that carry messages throughout the body) increase during pregnancy. This can affect how your muscles and joints work together. All of these changes can lead to pain. Pain may be felt in the upper or lower back. Pain is also common in the pelvis. Some pregnant women have sciatica. This is pain caused by pressure on the sciatic nerve running down the back of the leg. Ask your healthcare provider for specific tips and exercises to help control your back pain.  Tips to help you rest  Good rest and sleep will help you feel better. Here are some ideas:  · Ask your partner to massage your shoulders, neck, or back.  · Limit the errands you do each day.  · Lie down in the afternoon or after work for a few minutes.  · Take a warm bath before you go to sleep.  · Drink warm milk or teas without caffeine.  · Avoid coffee, black tea, and cola.  Stopping heartburn  · Avoid spicy or acidic foods.  · Eat small amounts more often. Eat slowly. · Wait 2 hours after eating before lying down.  · Sleep with your upper body raised 6 inches.   Managing mood swings  Ways to manage mood swings include:  · Know that mood changes are normal.  · Exercise often, but get plenty of  rest.  · Address any concerns and limit stress. Talking to your partner, other women, or your healthcare provider may help.  Dealing with urinary frequency  Tips to deal with having to urinate often include:  · Drink plenty of water all day. If you drink a lot in the evening, though, you may have to get up more in the night.  · Limit coffee, black tea, and cola.  Date Last Reviewed: 8/16/2015  © 6309-0681 [employer]. [address] [place] [address]. All rights reserved. This information is not intended as a substitute for professional medical care. Always follow your healthcare professional's instructions.

## 2019-06-06 DIAGNOSIS — O99.810 GLUCOSE INTOLERANCE OF PREGNANCY: Primary | ICD-10-CM

## 2019-06-06 LAB
HIV 1+2 AB+HIV1 P24 AG SERPL QL IA: NEGATIVE
RPR SER QL: NORMAL

## 2019-06-07 ENCOUNTER — PATIENT MESSAGE (OUTPATIENT)
Dept: OBSTETRICS AND GYNECOLOGY | Facility: CLINIC | Age: 39
End: 2019-06-07

## 2019-06-11 ENCOUNTER — PATIENT MESSAGE (OUTPATIENT)
Dept: OBSTETRICS AND GYNECOLOGY | Facility: CLINIC | Age: 39
End: 2019-06-11

## 2019-06-17 ENCOUNTER — LAB VISIT (OUTPATIENT)
Dept: LAB | Facility: HOSPITAL | Age: 39
End: 2019-06-17
Payer: MEDICAID

## 2019-06-17 DIAGNOSIS — O99.810 GLUCOSE INTOLERANCE OF PREGNANCY: ICD-10-CM

## 2019-06-17 LAB
GLUCOSE SERPL-MCNC: 118 MG/DL
GLUCOSE SERPL-MCNC: 141 MG/DL
GLUCOSE SERPL-MCNC: 166 MG/DL
GLUCOSE SERPL-MCNC: 84 MG/DL (ref 70–110)

## 2019-06-17 PROCEDURE — 36415 COLL VENOUS BLD VENIPUNCTURE: CPT

## 2019-06-17 PROCEDURE — 82951 GLUCOSE TOLERANCE TEST (GTT): CPT

## 2019-06-17 PROCEDURE — 82952 GTT-ADDED SAMPLES: CPT

## 2019-06-24 ENCOUNTER — PROCEDURE VISIT (OUTPATIENT)
Dept: OBSTETRICS AND GYNECOLOGY | Facility: CLINIC | Age: 39
End: 2019-06-24
Payer: MEDICAID

## 2019-06-24 ENCOUNTER — ROUTINE PRENATAL (OUTPATIENT)
Dept: OBSTETRICS AND GYNECOLOGY | Facility: CLINIC | Age: 39
End: 2019-06-24
Payer: MEDICAID

## 2019-06-24 VITALS — DIASTOLIC BLOOD PRESSURE: 78 MMHG | SYSTOLIC BLOOD PRESSURE: 128 MMHG | WEIGHT: 212.75 LBS | BODY MASS INDEX: 40.2 KG/M2

## 2019-06-24 DIAGNOSIS — O09.529 AMA (ADVANCED MATERNAL AGE) MULTIGRAVIDA 35+, UNSPECIFIED TRIMESTER: Primary | ICD-10-CM

## 2019-06-24 DIAGNOSIS — I10 HYPERTENSION, UNSPECIFIED TYPE: ICD-10-CM

## 2019-06-24 DIAGNOSIS — O99.810 GLUCOSE INTOLERANCE OF PREGNANCY: ICD-10-CM

## 2019-06-24 PROCEDURE — 76816 OB US FOLLOW-UP PER FETUS: CPT | Mod: PBBFAC | Performed by: OBSTETRICS & GYNECOLOGY

## 2019-06-24 PROCEDURE — 76816 OB US FOLLOW-UP PER FETUS: CPT | Mod: 26,S$PBB,, | Performed by: OBSTETRICS & GYNECOLOGY

## 2019-06-24 PROCEDURE — 76816 PR  US,PREGNANT UTERUS,F/U,TRANSABD APP: ICD-10-PCS | Mod: 26,S$PBB,, | Performed by: OBSTETRICS & GYNECOLOGY

## 2019-06-24 PROCEDURE — 99213 OFFICE O/P EST LOW 20 MIN: CPT | Mod: PBBFAC,TH,25 | Performed by: ADVANCED PRACTICE MIDWIFE

## 2019-06-24 PROCEDURE — 99213 PR OFFICE/OUTPT VISIT, EST, LEVL III, 20-29 MIN: ICD-10-PCS | Mod: TH,S$PBB,, | Performed by: ADVANCED PRACTICE MIDWIFE

## 2019-06-24 PROCEDURE — 99213 OFFICE O/P EST LOW 20 MIN: CPT | Mod: TH,S$PBB,, | Performed by: ADVANCED PRACTICE MIDWIFE

## 2019-06-24 PROCEDURE — 99999 PR PBB SHADOW E&M-EST. PATIENT-LVL III: CPT | Mod: PBBFAC,,, | Performed by: ADVANCED PRACTICE MIDWIFE

## 2019-06-24 PROCEDURE — 99999 PR PBB SHADOW E&M-EST. PATIENT-LVL III: ICD-10-PCS | Mod: PBBFAC,,, | Performed by: ADVANCED PRACTICE MIDWIFE

## 2019-06-24 NOTE — PROGRESS NOTES
"Passed 3hr GTT  No HSV outbreak  Stopped Procardia independently in February also baby ASA- Normotensive, advise to continue ASA. Will watch BP   US- Vtx,Posterior placenta, Normal fluid, EFW 2.15ozs, RVOT seen- WNL- Anatomy completed- reassuring  Start weekly US at 32 weeks secondary to AMA  Considering Tdap next visit  Not sure re birth control    Patient viewed [employer] video, Fall in Love and was provided with [employer] handouts: Benefits of Breastfeeding, "Exclusive Breastfeeding," and Skin to Skin with Your Baby. Discussed benefits of skin to skin, the magic first hour, delaying routine procedures, benefits of breastfeeding, and the importance of the first early feeding, and the importance of exclusive breastfeeding. Encouraged patient to attend [facility]s Prenatal Breastfeeding Class and to download the [employer] mobile pasquale if she has not already done so.  Patient verbalizes understanding.    Patient viewed [employer] video, Nourish and was provided with [employer] handouts: A Good Latch and Tips for a More Comfortable Labor. Discussed nonpharmacological pain relief methods for labor, techniques and benefits of effective breastfeeding position and latch, and basic breastfeeding management. Encouraged patient to attend [facility]s Prenatal Breastfeeding Class and to download the [employer] mobile pasquale if she has not already done so. Patient verbalizes understanding.     "

## 2019-06-24 NOTE — PATIENT INSTRUCTIONS
Kick Counts    Its normal to worry about your babys health. One way you can know your babys doing well is to record the babys movements once a day. This is called a kick count. Remember to take your kick count records to all your appointments with your healthcare provider.  How to count kicks  Here are tips for counting kicks:  · Choose a time when the baby is active, such as after a meal.   · Sit comfortably or lie on your side.   · The first time the baby moves, write down the time.   · Count each movement until the baby has moved 10 times. This can take from 20 minutes to 2 hours.   · Try to do it at the same time each day.  When to call your healthcare provider  Call your healthcare provider right away if you notice any of the following:  · Your baby moves fewer than 10 times in 2 hours while youre doing kick counts.  · Your baby moves much less often than on the days before.  · You have not felt your baby move all day.  Date Last Reviewed: 8/5/2015 © 2000-2017 [employer]. [address] [place] [address]. All rights reserved. This information is not intended as a substitute for professional medical care. Always follow your healthcare professional's instructions.        Recognizing Labor    The beginning of labor is the beginning of birth. Youll start to feel strong contractions. Thats when the muscles of your uterus tighten up to help push your baby out during birth.  Yes, labor has probably started   Signs of labor include:  · Your contractions are getting stronger and more painful instead of weaker. Youll probably feel them throughout your whole uterus.  · Your contractions are regular. This means that you feel them about every 5 to 10 minutes. And they are getting closer together.  · You have pink-colored or blood-streaked fluid from your vagina.  · Your water breaks. It may be a gush or a slow trickle of clear fluid from your vagina.  No, its probably not real  labor   Signs of false labor include:  · Your contractions arent regular or strong.  · You feel the contractions only in your lower uterus.  · Your contractions go away when you walk or change position.  · Your contractions go away after drinking fluids.  When to call your healthcare provider  Call your healthcare provider or clinic right away if you notice any of these signs:  · Fluid from your vagina, with or without contractions.  · Bleeding heavy enough that you need a sanitary pad.  · You dont feel your baby moving as much as before.     NOTE: Contractions are timed by both of these measures:  · The length of each contraction from its start to its finish.  · How far apart the contractions are -- the time between the start of one contraction and the start of the next contraction.   Date Last Reviewed: 8/9/2015 © 2000-2017 [employer]. [address] [place] [address], [place] [address]. All rights reserved. This information is not intended as a substitute for professional medical care. Always follow your healthcare professional's instructions.        Pregnancy: Your Third Trimester Changes  As the baby grows, your body changes too. You may also see signs that your body is getting ready for labor. Be patient. Within a few more weeks, your baby will be born.    How you are changing  Your body is preparing for the birth of your baby. Some of the most common changes are listed below. If you have any questions or concerns, ask your healthcare provider:  · Youll gain more weight from fluids, extra blood, and fat deposits.  · Your breasts will grow as your body gets ready to feed the baby. They may be more tender. You may also notice a slight yellow or white discharge from the nipples.  · Discharge from your vagina may increase. This is normal.  · You might see some skin color changes on your forehead, cheeks, or nose. Most of these will go away after you deliver.  How your baby is growing    Month 7  Your  baby can open and close his or her eyes, and weighs around 4 pounds. If born prematurely (too early), your baby would likely survive with special care.   Month 8  Your baby is building up body fat, and weighs around 6 pounds.    Month 9  Your baby weighs nearly 7 pounds and is about 18 to 20 inches long. In other words, any day now...   Date Last Reviewed: 8/16/2015  © 9137-8567 [employer]. [address] [place] [address], [place] [address]. All rights reserved. This information is not intended as a substitute for professional medical care. Always follow your healthcare professional's instructions.        Adapting to Pregnancy: Third Trimester    Although common during pregnancy, some discomforts may seem worse in the final weeks. Simple lifestyle changes can help. Take care of yourself. And ask your partner to help out with small tasks.  Limiting leg problems  Ways to combat leg issues:  · Wear support hose all day.  · Avoid snug shoes and clothes that bind, like tight pants and socks with elastic tops.  · Sit with your feet and legs raised often.  Caring for your breasts  Tips to follow include:  · Wash with plain water. Avoid using harsh soaps or rubbing alcohol. They may cause dryness.  · Wear a nursing bra for extra support. It can also hide any leaks from your nipples.  Controlling hemorrhoids  Ways to avoid hemorrhoids include:  · Eat foods that are high in fiber. Also, exercise and drink enough fluids. This will reduce constipation and hemorrhoids.  · Sleep and nap on your side. This limits pressure on the veins of your rectum.  · Try not to stand or sit for long periods.  Controlling back pain  As your body changes during pregnancy, your back must work in new ways. Back pain is due to many causes. Physical changes in your body can strain your back and its supporting muscles. Also, hormones (chemicals that carry messages throughout the body) increase during pregnancy. This can affect how your muscles  and joints work together. All of these changes can lead to pain. Pain may be felt in the upper or lower back. Pain is also common in the pelvis. Some pregnant women have sciatica. This is pain caused by pressure on the sciatic nerve running down the back of the leg. Ask your healthcare provider for specific tips and exercises to help control your back pain.  Tips to help you rest  Good rest and sleep will help you feel better. Here are some ideas:  · Ask your partner to massage your shoulders, neck, or back.  · Limit the errands you do each day.  · Lie down in the afternoon or after work for a few minutes.  · Take a warm bath before you go to sleep.  · Drink warm milk or teas without caffeine.  · Avoid coffee, black tea, and cola.  Stopping heartburn  · Avoid spicy or acidic foods.  · Eat small amounts more often. Eat slowly. · Wait 2 hours after eating before lying down.  · Sleep with your upper body raised 6 inches.   Managing mood swings  Ways to manage mood swings include:  · Know that mood changes are normal.  · Exercise often, but get plenty of rest.  · Address any concerns and limit stress. Talking to your partner, other women, or your healthcare provider may help.  Dealing with urinary frequency  Tips to deal with having to urinate often include:  · Drink plenty of water all day. If you drink a lot in the evening, though, you may have to get up more in the night.  · Limit coffee, black tea, and cola.  Date Last Reviewed: 8/16/2015  © 0319-1709 [employer]. [address] [place] [address], [place] [address]. All rights reserved. This information is not intended as a substitute for professional medical care. Always follow your healthcare professional's instructions.

## 2019-10-24 ENCOUNTER — TELEPHONE (OUTPATIENT)
Dept: OBSTETRICS AND GYNECOLOGY | Facility: HOSPITAL | Age: 39
End: 2019-10-24

## 2019-10-25 NOTE — TELEPHONE ENCOUNTER
Received a phone call from Sima, who was requesting an OPC with lactation for her two month old infant who has been refusing to latch. Sima has been breastfeeding her baby 3-4 times per day and pumping occasionally with her personal Ameda pump. Sima has been giving her baby formula at all other feedings, every 2-3 hours. Mother stated that she would like to be giving her baby only breast milk, but her goal is just for her baby to be happy while nursing. Discussed mechanisms of milk supply, use of galactagogues (mother is taking 2 fenugreek capsules per day), increasing frequency of pumping. Mother wants a lactation consult because she feels that she may not have been using proper positioning and latch technique from the start. She would like to be seen and counseled in person. Resources and videos provided for patient education and OPC scheduled for 10/31/19. Reviewed expectations for consult. Mother verbalized her understanding.

## 2019-10-30 ENCOUNTER — LACTATION CONSULT (OUTPATIENT)
Dept: LACTATION | Facility: CLINIC | Age: 39
End: 2019-10-30
Payer: MEDICAID

## 2019-10-30 DIAGNOSIS — Z91.89 BREASTFEEDING PROBLEM: Primary | ICD-10-CM

## 2019-10-30 NOTE — PROGRESS NOTES
Start time : 10:20  End time: 12:20     Reason for Consult:   Romina gaxiola  Has reflux  Low milk supply    Pertinent Medical History:    Mother  G  1  P    1  Complications of pregnancy:     Complications of delivery: ineffective epidural; general   Previous breastfeeding experience:   Current medications/ Supplements: none  Maternal Medical History: negative      Baby  Mother reports that baby has episodes where she chokes- typically 30 minutes or so after feeding.  This happens once every few weeks.     Pregnancy/ Delivery    Delivery Date: 19   [facility]  Pediatrician: Mely Orona                 Gestational Age:   37/0    Type of birth: - general anesthesia  Feedings in hospital:  Latched on well initially ; fed bottles of formula as well as breastfeeding                          Mother    Age: 38  Breast assessment: pendulous, soft, everted nipples  Pumpin-3  X per day      1.5-2 oz ounces per pump  [facility] grade            27 mm flange size  Pain:  none    Infant     Birth weight: 6 lbs 6 oz                            Use of bottle:  YAEL pichardo  Current/ most recent weight                    If yes, how often? Volume? 4 oz  Similac alimentum with oatmeal  Feeds per day:  q 2-3 hours                    Voids per day:several  Length of feeds: 10 minutes                    Stools per day:several:    Color/ consistency: yellow/brown    Oral Assessment:    Difficult to perform suck assessment.  Hyperactive gag reflex.  Able to achieve suck for a few seconds. Baby able to curl tongue around finger, however suck was a mashing/chomping motion. With upper lip passively flanged, upper maxillary labial frenum taut with blanching noted at center of upper gums. Lateralization of tongue good. Squaring of tongue noted. Lingual frenum taut and appears to inhibit tongue elevation in the posterior aspect of the tongue. Slightly arched palate. Tight bands of tissue  noted to buccal area bilaterally.     Mother reports baby is fussy often.  Tolerates tummy time for a few minutes.                        Feeding assessment    Infant pre-feeding weight in clothes: 12 lbs 7.9 oz  Weight after feedin lbs 9.8 oz  Total feeding time: 25 minutes   Total transfer: 1.9 oz    Left side: 1.4 oz in 15 minutes  Right side: 0.5 oz in 10 minutes    Mother reports baby took 2 oz around 10 am and typically takes 4 oz every 4 hours.      At breast:  Position: cross cradle  Latch: Able to achieve deep latch easily with moderate assist  Coordination: Disorganized for the first few sucks but then adequate  Concerns:  Baby effectively transferred from breast.  Relaxed position. No coming off or fussing at breast.  Clicking heard occasionally.  Baby also swallowing a good bit of air while nursing.        Pumping:  Mother pumped afterwards with a hospital grade Ameda pump.  Obtained 3 mls total.  Only drops from the left side.  Instructed on hands on pumping to increase milk removal.       Impression  Knowledge deficit  Reported improvement of milk removal with improved positioning/latch  Infant with tethered oral tissue  Low Milk Supply  Baby able to remove milk well from breast; however concerns about how baby is tolerating feeds

## 2019-10-30 NOTE — PATIENT INSTRUCTIONS
Plan of Care    1. Put baby to breast or pump as often as possible- goal of removing milk 8 or more times a day.  2. Supplement baby as needed  3.  Use hands on pumping methods when pumping to increase effectiveness.  4.  Use asymmetrical latch technique and compression to assist with milk transfer.  5.  Consider using supplements to increase supply such as [employer]al[employer].   6. Hold baby skin to skin as often as possible.      Recommendations  1. Referral to ENT ( Dr. Isela Ledbetter) from Dr. Philip  2. Referral to speech therapy for functional evaluation.    Call Lactation [facility] with any questions or concerns [phone].

## 2019-10-31 ENCOUNTER — TELEPHONE (OUTPATIENT)
Dept: INTERNAL MEDICINE | Facility: CLINIC | Age: 39
End: 2019-10-31

## 2019-10-31 NOTE — TELEPHONE ENCOUNTER
----- Message from Delbert Malik sent at 10/31/2019  1:44 PM CDT -----  Contact: Self- [phone]  .Type:  Patient Requesting Referral    Who Called:Sima Pham    Does the patient already have the specialty appointment scheduled?:No   If yes, what is the date of that appointment?:  Referral to What Specialty:ENT   Reason for Referral:Snoring   Does the patient want the referral with a specific physician?:No   [facility]   Patient Requesting a Response?:yes   Would the patient rather a call back or a response via [facility]? Call back   Best Call Back Number:.[phone] (home)     Additional Information:

## 2019-10-31 NOTE — TELEPHONE ENCOUNTER
Informed the patient that I sent a message to Dr Elmore about her referral.  Will call the patient back when he responds.

## 2019-11-06 ENCOUNTER — TELEPHONE (OUTPATIENT)
Dept: INTERNAL MEDICINE | Facility: CLINIC | Age: 39
End: 2019-11-06

## 2019-11-06 NOTE — TELEPHONE ENCOUNTER
Patient calling today to see if Dr. Elmore had a chance to see if she should have a sleep study. She said she snores a lot and has been told it looks like she stops breathing. First msg was sent on 10/31/19. I did let her know he has been out of the clinic a couple of days.

## 2019-11-06 NOTE — TELEPHONE ENCOUNTER
----- Message from Angelica Horton sent at 11/6/2019 11:20 AM CST -----  Contact: patient   Pt requesting a call back regarding ENT referral.Please call back at [phone].          Thanks,  Angelica Horton

## 2020-10-06 ENCOUNTER — PATIENT MESSAGE (OUTPATIENT)
Dept: ADMINISTRATIVE | Facility: HOSPITAL | Age: 40
End: 2020-10-06

## 2020-12-11 ENCOUNTER — PATIENT MESSAGE (OUTPATIENT)
Dept: OTHER | Facility: OTHER | Age: 40
End: 2020-12-11

## 2021-04-29 ENCOUNTER — PATIENT MESSAGE (OUTPATIENT)
Dept: RESEARCH | Facility: HOSPITAL | Age: 41
End: 2021-04-29

## 2021-07-09 ENCOUNTER — IMMUNIZATION (OUTPATIENT)
Dept: INTERNAL MEDICINE | Facility: CLINIC | Age: 41
End: 2021-07-09
Payer: OTHER GOVERNMENT

## 2021-07-09 DIAGNOSIS — Z23 NEED FOR VACCINATION: Primary | ICD-10-CM

## 2021-07-09 PROCEDURE — 91300 COVID-19, MRNA, LNP-S, PF, 30 MCG/0.3 ML DOSE VACCINE: CPT | Mod: ,,, | Performed by: FAMILY MEDICINE

## 2021-07-09 PROCEDURE — 91300 COVID-19, MRNA, LNP-S, PF, 30 MCG/0.3 ML DOSE VACCINE: ICD-10-PCS | Mod: ,,, | Performed by: FAMILY MEDICINE

## 2021-07-09 PROCEDURE — 0001A COVID-19, MRNA, LNP-S, PF, 30 MCG/0.3 ML DOSE VACCINE: ICD-10-PCS | Mod: CV19,,, | Performed by: FAMILY MEDICINE

## 2021-07-09 PROCEDURE — 0001A COVID-19, MRNA, LNP-S, PF, 30 MCG/0.3 ML DOSE VACCINE: CPT | Mod: CV19,,, | Performed by: FAMILY MEDICINE

## 2021-07-30 ENCOUNTER — IMMUNIZATION (OUTPATIENT)
Dept: INTERNAL MEDICINE | Facility: CLINIC | Age: 41
End: 2021-07-30
Payer: OTHER GOVERNMENT

## 2021-07-30 DIAGNOSIS — Z23 NEED FOR VACCINATION: Primary | ICD-10-CM

## 2021-07-30 PROCEDURE — 0002A COVID-19, MRNA, LNP-S, PF, 30 MCG/0.3 ML DOSE VACCINE: CPT | Mod: CV19,,, | Performed by: FAMILY MEDICINE

## 2021-07-30 PROCEDURE — 91300 COVID-19, MRNA, LNP-S, PF, 30 MCG/0.3 ML DOSE VACCINE: CPT | Mod: ,,, | Performed by: FAMILY MEDICINE

## 2021-07-30 PROCEDURE — 0002A COVID-19, MRNA, LNP-S, PF, 30 MCG/0.3 ML DOSE VACCINE: ICD-10-PCS | Mod: CV19,,, | Performed by: FAMILY MEDICINE

## 2021-07-30 PROCEDURE — 91300 COVID-19, MRNA, LNP-S, PF, 30 MCG/0.3 ML DOSE VACCINE: ICD-10-PCS | Mod: ,,, | Performed by: FAMILY MEDICINE

## 2021-12-28 ENCOUNTER — PATIENT MESSAGE (OUTPATIENT)
Dept: ADMINISTRATIVE | Facility: OTHER | Age: 41
End: 2021-12-28
Payer: OTHER GOVERNMENT

## 2022-10-10 ENCOUNTER — LAB VISIT (OUTPATIENT)
Dept: LAB | Facility: HOSPITAL | Age: 42
End: 2022-10-10
Payer: MEDICAID

## 2022-10-10 ENCOUNTER — OFFICE VISIT (OUTPATIENT)
Dept: INTERNAL MEDICINE | Facility: CLINIC | Age: 42
End: 2022-10-10
Payer: MEDICAID

## 2022-10-10 VITALS
HEART RATE: 65 BPM | BODY MASS INDEX: 35.75 KG/M2 | TEMPERATURE: 98 F | WEIGHT: 189.38 LBS | SYSTOLIC BLOOD PRESSURE: 158 MMHG | HEIGHT: 61 IN | OXYGEN SATURATION: 100 % | DIASTOLIC BLOOD PRESSURE: 100 MMHG

## 2022-10-10 DIAGNOSIS — E66.01 SEVERE OBESITY (BMI 35.0-35.9 WITH COMORBIDITY): ICD-10-CM

## 2022-10-10 DIAGNOSIS — I10 HYPERTENSION, UNSPECIFIED TYPE: ICD-10-CM

## 2022-10-10 DIAGNOSIS — Z12.39 ENCOUNTER FOR SCREENING FOR MALIGNANT NEOPLASM OF BREAST, UNSPECIFIED SCREENING MODALITY: ICD-10-CM

## 2022-10-10 DIAGNOSIS — I10 HYPERTENSION, UNSPECIFIED TYPE: Primary | ICD-10-CM

## 2022-10-10 LAB
ALBUMIN SERPL BCP-MCNC: 4 G/DL (ref 3.5–5.2)
ALP SERPL-CCNC: 101 U/L (ref 55–135)
ALT SERPL W/O P-5'-P-CCNC: 17 U/L (ref 10–44)
ANION GAP SERPL CALC-SCNC: 8 MMOL/L (ref 8–16)
AST SERPL-CCNC: 17 U/L (ref 10–40)
BASOPHILS # BLD AUTO: 0.05 K/UL (ref 0–0.2)
BASOPHILS NFR BLD: 1.1 % (ref 0–1.9)
BILIRUB SERPL-MCNC: 0.6 MG/DL (ref 0.1–1)
BUN SERPL-MCNC: 12 MG/DL (ref 6–20)
CALCIUM SERPL-MCNC: 9.5 MG/DL (ref 8.7–10.5)
CHLORIDE SERPL-SCNC: 103 MMOL/L (ref 95–110)
CHOLEST SERPL-MCNC: 237 MG/DL (ref 120–199)
CHOLEST/HDLC SERPL: 4 {RATIO} (ref 2–5)
CO2 SERPL-SCNC: 28 MMOL/L (ref 23–29)
CREAT SERPL-MCNC: 1 MG/DL (ref 0.5–1.4)
DIFFERENTIAL METHOD: ABNORMAL
EOSINOPHIL # BLD AUTO: 0.1 K/UL (ref 0–0.5)
EOSINOPHIL NFR BLD: 1.3 % (ref 0–8)
ERYTHROCYTE [DISTWIDTH] IN BLOOD BY AUTOMATED COUNT: 13.7 % (ref 11.5–14.5)
EST. GFR  (NO RACE VARIABLE): >60 ML/MIN/1.73 M^2
GLUCOSE SERPL-MCNC: 89 MG/DL (ref 70–110)
HCT VFR BLD AUTO: 36.4 % (ref 37–48.5)
HDLC SERPL-MCNC: 59 MG/DL (ref 40–75)
HDLC SERPL: 24.9 % (ref 20–50)
HGB BLD-MCNC: 11.6 G/DL (ref 12–16)
IMM GRANULOCYTES # BLD AUTO: 0 K/UL (ref 0–0.04)
IMM GRANULOCYTES NFR BLD AUTO: 0 % (ref 0–0.5)
LDLC SERPL CALC-MCNC: 144.6 MG/DL (ref 63–159)
LYMPHOCYTES # BLD AUTO: 2.1 K/UL (ref 1–4.8)
LYMPHOCYTES NFR BLD: 46.5 % (ref 18–48)
MCH RBC QN AUTO: 28.1 PG (ref 27–31)
MCHC RBC AUTO-ENTMCNC: 31.9 G/DL (ref 32–36)
MCV RBC AUTO: 88 FL (ref 82–98)
MONOCYTES # BLD AUTO: 0.3 K/UL (ref 0.3–1)
MONOCYTES NFR BLD: 7.4 % (ref 4–15)
NEUTROPHILS # BLD AUTO: 2 K/UL (ref 1.8–7.7)
NEUTROPHILS NFR BLD: 43.7 % (ref 38–73)
NONHDLC SERPL-MCNC: 178 MG/DL
NRBC BLD-RTO: 0 /100 WBC
PLATELET # BLD AUTO: 236 K/UL (ref 150–450)
PMV BLD AUTO: 12.9 FL (ref 9.2–12.9)
POTASSIUM SERPL-SCNC: 4.2 MMOL/L (ref 3.5–5.1)
PROT SERPL-MCNC: 7.7 G/DL (ref 6–8.4)
RBC # BLD AUTO: 4.13 M/UL (ref 4–5.4)
SODIUM SERPL-SCNC: 139 MMOL/L (ref 136–145)
TRIGL SERPL-MCNC: 167 MG/DL (ref 30–150)
TSH SERPL DL<=0.005 MIU/L-ACNC: 1.53 UIU/ML (ref 0.4–4)
WBC # BLD AUTO: 4.47 K/UL (ref 3.9–12.7)

## 2022-10-10 PROCEDURE — 85025 COMPLETE CBC W/AUTO DIFF WBC: CPT

## 2022-10-10 PROCEDURE — 1160F RVW MEDS BY RX/DR IN RCRD: CPT | Mod: CPTII,,,

## 2022-10-10 PROCEDURE — 99999 PR PBB SHADOW E&M-EST. PATIENT-LVL IV: CPT | Mod: PBBFAC,,,

## 2022-10-10 PROCEDURE — 99204 PR OFFICE/OUTPT VISIT, NEW, LEVL IV, 45-59 MIN: ICD-10-PCS | Mod: S$PBB,,,

## 2022-10-10 PROCEDURE — 1159F PR MEDICATION LIST DOCUMENTED IN MEDICAL RECORD: ICD-10-PCS | Mod: CPTII,,,

## 2022-10-10 PROCEDURE — 3080F PR MOST RECENT DIASTOLIC BLOOD PRESSURE >= 90 MM HG: ICD-10-PCS | Mod: CPTII,,,

## 2022-10-10 PROCEDURE — 1159F MED LIST DOCD IN RCRD: CPT | Mod: CPTII,,,

## 2022-10-10 PROCEDURE — 3080F DIAST BP >= 90 MM HG: CPT | Mod: CPTII,,,

## 2022-10-10 PROCEDURE — 3008F PR BODY MASS INDEX (BMI) DOCUMENTED: ICD-10-PCS | Mod: CPTII,,,

## 2022-10-10 PROCEDURE — 84443 ASSAY THYROID STIM HORMONE: CPT

## 2022-10-10 PROCEDURE — 3077F SYST BP >= 140 MM HG: CPT | Mod: CPTII,,,

## 2022-10-10 PROCEDURE — 80053 COMPREHEN METABOLIC PANEL: CPT

## 2022-10-10 PROCEDURE — 99214 OFFICE O/P EST MOD 30 MIN: CPT | Mod: PBBFAC

## 2022-10-10 PROCEDURE — 80061 LIPID PANEL: CPT

## 2022-10-10 PROCEDURE — 3077F PR MOST RECENT SYSTOLIC BLOOD PRESSURE >= 140 MM HG: ICD-10-PCS | Mod: CPTII,,,

## 2022-10-10 PROCEDURE — 99999 PR PBB SHADOW E&M-EST. PATIENT-LVL IV: ICD-10-PCS | Mod: PBBFAC,,,

## 2022-10-10 PROCEDURE — 1160F PR REVIEW ALL MEDS BY PRESCRIBER/CLIN PHARMACIST DOCUMENTED: ICD-10-PCS | Mod: CPTII,,,

## 2022-10-10 PROCEDURE — 3008F BODY MASS INDEX DOCD: CPT | Mod: CPTII,,,

## 2022-10-10 PROCEDURE — 99204 OFFICE O/P NEW MOD 45 MIN: CPT | Mod: S$PBB,,,

## 2022-10-10 PROCEDURE — 36415 COLL VENOUS BLD VENIPUNCTURE: CPT

## 2022-10-10 RX ORDER — AMLODIPINE BESYLATE 10 MG/1
10 TABLET ORAL DAILY
Qty: 90 TABLET | Refills: 0 | Status: SHIPPED | OUTPATIENT
Start: 2022-10-10 | End: 2023-01-17

## 2022-10-10 NOTE — PROGRESS NOTES
Sima Pham  10/10/2022  [id number]    Alonzo Elmore MD  Patient Care Team:  Alonzo Elmore MD as PCP - General (Family Medicine)  Jessica Parra LPN as Care Coordinator (Internal Medicine)          Visit Type:an urgent visit for an existing problem     Chief Complaint:  Chief Complaint   Patient presents with    Hypertension       History of Present Illness:    History of HTN  She has not taken BP medication since 2019  Denies HA, SOB, CP, change in vision        History:  Past Medical History:   Diagnosis Date    Herpes simplex virus (HSV) infection     Hypertension      Past Surgical History:   Procedure Laterality Date    DILATION AND CURETTAGE OF UTERUS  2008     Family History   Problem Relation Age of Onset    Heart disease Mother     Kidney disease Father     Hypertension Father     Lung cancer Paternal Aunt     Diabetes Paternal Aunt     Hypertension Paternal Grandmother     Diabetes Cousin     Stroke Neg Hx     Breast cancer Neg Hx     Colon cancer Neg Hx     Ovarian cancer Neg Hx      Social History     Socioeconomic History    Marital status: Single    Number of children: 0   Tobacco Use    Smoking status: Never    Smokeless tobacco: Never   Substance and Sexual Activity    Alcohol use: Yes     Alcohol/week: 0.0 standard drinks    Drug use: No    Sexual activity: Yes     Partners: Male     Birth control/protection: None     Patient Active Problem List   Diagnosis    Obesity affecting pregnancy in first trimester    Supervision of elderly multigravida (>=35 years old at time of delivery), first trimester    HSV-2 infection complicating pregnancy, first trimester    Hypertension    Leiomyoma of uterus affecting pregnancy in first trimester    AMA (advanced maternal age) multigravida 35+, unspecified trimester    Glucose intolerance of pregnancy     Review of patient's allergies indicates:  No Known Allergies    The following were reviewed at this visit: active problem list, medication list,  allergies, family history, social history, and health maintenance.    Medications:  Current Outpatient Medications on File Prior to Visit   Medication Sig Dispense Refill    ELIDEL 1 % cream Apply 1 application topically 2 (two) times daily.  3    ketoconazole (NIZORAL) 2 % cream Apply 1 application topically 2 (two) times daily.  3    ondansetron (ZOFRAN-ODT) 8 MG TbDL Take 1 tablet (8 mg total) by mouth every 6 (six) hours as needed. (Patient not taking: Reported on 10/10/2022) 20 tablet 0    PNV,calcium 72-iron-folic acid (PRENATAL VITAMIN PLUS LOW IRON) 27 mg iron- 1 mg Tab Take 1 tablet (1 each total) by mouth once daily. 30 tablet 11    prenatal vit calc,iron,folic (PRENATAL VITAMIN ORAL) Take 1 tablet by mouth once daily.      promethazine (PHENERGAN) 25 MG tablet Take 1 tablet (25 mg total) by mouth every 4 (four) hours. (Patient not taking: Reported on 10/10/2022) 20 tablet 1    [DISCONTINUED] NIFEdipine (PROCARDIA-XL) 30 MG (OSM) 24 hr tablet TAKE 1 TABLET BY MOUTH EVERY DAY (Patient not taking: Reported on 10/10/2022) 30 tablet 0     No current facility-administered medications on file prior to visit.       Medications have been reviewed and reconciled with patient at this visit.  Barriers to medications reviewed with patient.    Adverse reactions to current medications reviewed with patient..    Over the counter medications reviewed and reconciled with patient.    Exam:  Wt Readings from Last 3 Encounters:   10/10/22 85.9 kg (189 lb 6 oz)   06/24/19 96.5 kg (212 lb 11.9 oz)   06/05/19 96.2 kg (212 lb 1.3 oz)     Temp Readings from Last 3 Encounters:   10/10/22 97.5 °F (36.4 °C) (Temporal)   05/25/18 97.7 °F (36.5 °C)   09/29/15 97.7 °F (36.5 °C)     BP Readings from Last 3 Encounters:   10/10/22 (!) 160/102   06/24/19 128/78   06/05/19 124/76     Pulse Readings from Last 3 Encounters:   10/10/22 65   05/25/18 94   09/29/15 70     Body mass index is 35.78 kg/m².      Review of Systems   Constitutional:  Negative.  Negative for chills and fever.   HENT: Negative.  Negative for congestion, sinus pain and sore throat.    Eyes:  Negative for blurred vision and double vision.   Respiratory:  Negative for cough, sputum production, shortness of breath and wheezing.    Cardiovascular:  Negative for chest pain, palpitations and leg swelling.   Gastrointestinal:  Negative for abdominal pain, constipation, diarrhea, heartburn, nausea and vomiting.   Genitourinary: Negative.    Musculoskeletal: Negative.    Skin: Negative.  Negative for rash.   Neurological: Negative.    Endo/Heme/Allergies: Negative.  Negative for polydipsia. Does not bruise/bleed easily.   Psychiatric/Behavioral:  Negative for depression and substance abuse.    Physical Exam  Vitals and nursing note reviewed.   Constitutional:       General: She is not in acute distress.     Appearance: She is well-developed. She is obese. She is not diaphoretic.   HENT:      Head: Normocephalic and atraumatic.      Right Ear: External ear normal.      Left Ear: External ear normal.   Eyes:      General:         Right eye: No discharge.         Left eye: No discharge.      Conjunctiva/sclera: Conjunctivae normal.      Pupils: Pupils are equal, round, and reactive to light.   Cardiovascular:      Rate and Rhythm: Normal rate and regular rhythm.      Heart sounds: Normal heart sounds. No murmur heard.  Pulmonary:      Effort: Pulmonary effort is normal. No respiratory distress.      Breath sounds: Normal breath sounds. No wheezing.   Neurological:      Mental Status: She is alert and oriented to person, place, and time.      Cranial Nerves: No cranial nerve deficit.   Psychiatric:         Behavior: Behavior normal.         Thought Content: Thought content normal.         Judgment: Judgment normal.       Laboratory Reviewed ({Yes)  Lab Results   Component Value Date    WBC 8.52 06/05/2019    HGB 10.7 (L) 06/05/2019    HCT 34.0 (L) 06/05/2019     06/05/2019    ALT 16  03/01/2019    AST 19 03/01/2019     (L) 03/01/2019    K 4.1 03/01/2019     03/01/2019    CREATININE 0.7 03/01/2019    BUN 6 03/01/2019    CO2 25 03/01/2019    TSH 2.100 09/27/2018    HGBA1C 5.5 03/01/2019       Sima was seen today for hypertension.    Diagnoses and all orders for this visit:    Hypertension, unspecified type  -     COMPREHENSIVE METABOLIC PANEL; Future  -     TSH; Future  -     CBC Auto Differential; Future  -     Lipid Panel; Future  -     amLODIPine (NORVASC) 10 MG tablet; Take 1 tablet (10 mg total) by mouth once daily.    Severe obesity (BMI 35.0-35.9 with comorbidity)  -     COMPREHENSIVE METABOLIC PANEL; Future  -     TSH; Future  -     CBC Auto Differential; Future  -     Lipid Panel; Future    Encounter for screening for malignant neoplasm of breast, unspecified screening modality  -     Mammo Digital Screening Bilat w/ Felipe; Future      Labs today  Start on Norvasc  HTN check in one month  DASH diet and exercise  Schedule MMG       Care Plan/Goals: Reviewed    Goals    None         Follow up: No follow-ups on file.    After visit summary was printed and given to patient upon discharge today.  Patient goals and care plan are included in After Visit Summary.

## 2023-01-30 ENCOUNTER — TELEPHONE (OUTPATIENT)
Dept: PRIMARY CARE CLINIC | Facility: CLINIC | Age: 43
End: 2023-01-30
Payer: MEDICAID

## 2023-01-30 NOTE — TELEPHONE ENCOUNTER
Patient called regarding stool concerns. Patient is scheduled a virtual with provider to discuss symptoms on 02/01/2023. Patient voiced understanding.  ----- Message from Fercho Pittman sent at 1/30/2023  9:08 AM CST -----  Contact: Sima  Patient stated that she has blood in her stool, requested an appt today and orders to be put in for mammogram. Please call her back [phone]

## 2023-02-01 ENCOUNTER — OFFICE VISIT (OUTPATIENT)
Dept: PRIMARY CARE CLINIC | Facility: CLINIC | Age: 43
End: 2023-02-01
Payer: MEDICAID

## 2023-02-01 DIAGNOSIS — I10 BENIGN ESSENTIAL HTN: ICD-10-CM

## 2023-02-01 DIAGNOSIS — Z12.11 COLON CANCER SCREENING: ICD-10-CM

## 2023-02-01 DIAGNOSIS — K62.5 BRBPR (BRIGHT RED BLOOD PER RECTUM): Primary | ICD-10-CM

## 2023-02-01 DIAGNOSIS — K64.8 OTHER HEMORRHOIDS: ICD-10-CM

## 2023-02-01 PROCEDURE — 99214 PR OFFICE/OUTPT VISIT, EST, LEVL IV, 30-39 MIN: ICD-10-PCS | Mod: 95,,, | Performed by: FAMILY MEDICINE

## 2023-02-01 PROCEDURE — 99214 OFFICE O/P EST MOD 30 MIN: CPT | Mod: 95,,, | Performed by: FAMILY MEDICINE

## 2023-02-01 NOTE — PROGRESS NOTES
Subjective:    The patient location is: [place] in her parked car  Visit type: Virtual visit with synchronous audio and video  Total time spent with patient: 20 mins  Each patient to whom he or she provides medical services by telemedicine is:  (1) informed of the relationship between the physician and patient and the respective role of any other health care provider with respect to management of the patient; and (2) notified that he or she may decline to receive medical services by telemedicine and may withdraw from such care at any time.     Patient ID: Sima Pham is a 42 y.o. female.    Chief Complaint: BRBPR        History of Present Illness:   Sima Pham 42 y.o. female presents today with     BRBPR x 2 episodes -Friday and Saturday  night, and then dark stool for a day.  She has had hemorrhoids in the past. Stool has since turned back to normal color  Denies constipation, Anemia and GERD sxs      Past Medical History:   Diagnosis Date    Herpes simplex virus (HSV) infection     Hypertension      Family History   Problem Relation Age of Onset    Heart disease Mother     Kidney disease Father     Hypertension Father     Lung cancer Paternal Aunt     Diabetes Paternal Aunt     Hypertension Paternal Grandmother     Diabetes Cousin     Stroke Neg Hx     Breast cancer Neg Hx     Colon cancer Neg Hx     Ovarian cancer Neg Hx      Social History     Socioeconomic History    Marital status: Single    Number of children: 0   Tobacco Use    Smoking status: Never    Smokeless tobacco: Never   Substance and Sexual Activity    Alcohol use: Yes     Alcohol/week: 0.0 standard drinks    Drug use: No    Sexual activity: Yes     Partners: Male     Birth control/protection: None     Outpatient Encounter Medications as of 2/1/2023   Medication Sig Dispense Refill    amLODIPine (NORVASC) 10 MG tablet TAKE 1 TABLET(10 MG) BY MOUTH EVERY DAY 90 tablet 0    ELIDEL 1 % cream Apply 1 application topically 2 (two) times daily.  3     ketoconazole (NIZORAL) 2 % cream Apply 1 application topically 2 (two) times daily.  3    ondansetron (ZOFRAN-ODT) 8 MG TbDL Take 1 tablet (8 mg total) by mouth every 6 (six) hours as needed. (Patient not taking: Reported on 10/10/2022) 20 tablet 0    PNV,calcium 72-iron-folic acid (PRENATAL VITAMIN PLUS LOW IRON) 27 mg iron- 1 mg Tab Take 1 tablet (1 each total) by mouth once daily. 30 tablet 11    prenatal vit calc,iron,folic (PRENATAL VITAMIN ORAL) Take 1 tablet by mouth once daily.      promethazine (PHENERGAN) 25 MG tablet Take 1 tablet (25 mg total) by mouth every 4 (four) hours. (Patient not taking: Reported on 10/10/2022) 20 tablet 1     No facility-administered encounter medications on file as of 2/1/2023.       Review of Systems   Constitutional:  Negative for activity change and unexpected weight change.   HENT:  Negative for hearing loss, rhinorrhea and trouble swallowing.    Eyes:  Negative for discharge and visual disturbance.   Respiratory:  Negative for chest tightness and wheezing.    Cardiovascular:  Negative for chest pain and palpitations.   Gastrointestinal:  Positive for blood in stool. Negative for constipation, diarrhea and vomiting.   Endocrine: Negative for polydipsia and polyuria.   Genitourinary:  Negative for difficulty urinating, dysuria, hematuria and menstrual problem.   Musculoskeletal:  Negative for arthralgias, joint swelling and neck pain.   Neurological:  Negative for weakness and headaches.   Psychiatric/Behavioral:  Negative for confusion and dysphoric mood.        Objective:      There were no vitals taken for this visit.  Physical Exam    CONSTITUTIONAL: No apparent distress. Appears comfortable.   CARDIOVASCULAR: No perioral cyanosis  PULMONARY: Breathing unlabored. No retractions Chest expansion grossly normal.  PSYCHIATRIC: Alert and conversant and grossly oriented. Mood is grossly neutral. Affect appropriate. Judgment and insight grossly intact.  NEUROLOGIC: No focal  sensory deficits reported.   Results for orders placed or performed in visit on 10/10/22   COMPREHENSIVE METABOLIC PANEL   Result Value Ref Range    Sodium 139 136 - 145 mmol/L    Potassium 4.2 3.5 - 5.1 mmol/L    Chloride 103 95 - 110 mmol/L    CO2 28 23 - 29 mmol/L    Glucose 89 70 - 110 mg/dL    BUN 12 6 - 20 mg/dL    Creatinine 1.0 0.5 - 1.4 mg/dL    Calcium 9.5 8.7 - 10.5 mg/dL    Total Protein 7.7 6.0 - 8.4 g/dL    Albumin 4.0 3.5 - 5.2 g/dL    Total Bilirubin 0.6 0.1 - 1.0 mg/dL    Alkaline Phosphatase 101 55 - 135 U/L    AST 17 10 - 40 U/L    ALT 17 10 - 44 U/L    Anion Gap 8 8 - 16 mmol/L    eGFR >60.0 >60 mL/min/1.73 m^2   TSH   Result Value Ref Range    TSH 1.530 0.400 - 4.000 uIU/mL   CBC Auto Differential   Result Value Ref Range    WBC 4.47 3.90 - 12.70 K/uL    RBC 4.13 4.00 - 5.40 M/uL    Hemoglobin 11.6 (L) 12.0 - 16.0 g/dL    Hematocrit 36.4 (L) 37.0 - 48.5 %    MCV 88 82 - 98 fL    MCH 28.1 27.0 - 31.0 pg    MCHC 31.9 (L) 32.0 - 36.0 g/dL    RDW 13.7 11.5 - 14.5 %    Platelets 236 150 - 450 K/uL    MPV 12.9 9.2 - 12.9 fL    Immature Granulocytes 0.0 0.0 - 0.5 %    Gran # (ANC) 2.0 1.8 - 7.7 K/uL    Immature Grans (Abs) 0.00 0.00 - 0.04 K/uL    Lymph # 2.1 1.0 - 4.8 K/uL    Mono # 0.3 0.3 - 1.0 K/uL    Eos # 0.1 0.0 - 0.5 K/uL    Baso # 0.05 0.00 - 0.20 K/uL    nRBC 0 0 /100 WBC    Gran % 43.7 38.0 - 73.0 %    Lymph % 46.5 18.0 - 48.0 %    Mono % 7.4 4.0 - 15.0 %    Eosinophil % 1.3 0.0 - 8.0 %    Basophil % 1.1 0.0 - 1.9 %    Differential Method Automated    Lipid Panel   Result Value Ref Range    Cholesterol 237 (H) 120 - 199 mg/dL    Triglycerides 167 (H) 30 - 150 mg/dL    HDL 59 40 - 75 mg/dL    LDL Cholesterol 144.6 63.0 - 159.0 mg/dL    HDL/Cholesterol Ratio 24.9 20.0 - 50.0 %    Total Cholesterol/HDL Ratio 4.0 2.0 - 5.0    Non-HDL Cholesterol 178 mg/dL     Assessment:       1. BRBPR (bright red blood per rectum)    2. Colon cancer screening    3. Benign essential HTN    4. Other hemorrhoids         Plan:   BRBPR (bright red blood per rectum)  -     Ambulatory referral/consult to Endo Procedure ; Future; Expected date: 02/02/2023    Colon cancer screening  -     Ambulatory referral/consult to Endo Procedure ; Future; Expected date: 02/02/2023    Benign essential HTN  Comments:  not controlled. Stay on same med and follow up with pcp  Orders:  -     Hypertension Digital Medicine (HDMP) Enrollment Order  -     Hypertension Digital Medicine (HDMP): Assign Onboarding Questionnaires    Other hemorrhoids  Comments:  self reported    I have reviewed all of the patient's clinical history available in care everywhere and Epic and have utilized this in my evaluation and management recommendations today.   Could be internal hemorrhoid, diverticulosis or superficial blood vessel that bled. Bleeding has stopped and she is doing well. She is due for colonoscopy and we will proceed with that. Follow up with PCP.   Treatment options and alternatives were discussed with the patient. Patient was given ample time to ask questions. All questions were answered. Voices understanding and acceptance of this advice. Will call back if any further questions or concerns.    Beatrice Dewey MD

## 2023-02-07 ENCOUNTER — HOSPITAL ENCOUNTER (OUTPATIENT)
Dept: PREADMISSION TESTING | Facility: HOSPITAL | Age: 43
Discharge: HOME | End: 2023-02-07
Attending: FAMILY MEDICINE
Payer: MEDICAID

## 2023-02-07 DIAGNOSIS — K62.5 BRBPR (BRIGHT RED BLOOD PER RECTUM): Primary | ICD-10-CM

## 2023-02-07 DIAGNOSIS — Z12.11 COLON CANCER SCREENING: ICD-10-CM

## 2023-02-07 RX ORDER — SODIUM, POTASSIUM,MAG SULFATES 17.5-3.13G
1 SOLUTION, RECONSTITUTED, ORAL ORAL DAILY
Qty: 1 KIT | Refills: 0 | Status: SHIPPED | OUTPATIENT
Start: 2023-02-07 | End: 2023-02-09

## 2023-03-14 DIAGNOSIS — Z12.11 COLON CANCER SCREENING: ICD-10-CM

## 2023-03-14 DIAGNOSIS — K62.5 BRBPR (BRIGHT RED BLOOD PER RECTUM): Primary | ICD-10-CM

## 2023-05-04 ENCOUNTER — HOSPITAL ENCOUNTER (OUTPATIENT)
Dept: PREADMISSION TESTING | Facility: HOSPITAL | Age: 43
Discharge: HOME | End: 2023-05-04
Attending: FAMILY MEDICINE
Payer: MEDICAID

## 2023-05-04 DIAGNOSIS — K62.5 BRBPR (BRIGHT RED BLOOD PER RECTUM): Primary | ICD-10-CM

## 2023-05-04 DIAGNOSIS — Z12.11 COLON CANCER SCREENING: ICD-10-CM

## 2023-05-05 RX ORDER — POLYETHYLENE GLYCOL 3350, SODIUM SULFATE ANHYDROUS, SODIUM BICARBONATE, SODIUM CHLORIDE, POTASSIUM CHLORIDE 236; 22.74; 6.74; 5.86; 2.97 G/4L; G/4L; G/4L; G/4L; G/4L
4 POWDER, FOR SOLUTION ORAL ONCE
Qty: 4000 ML | Refills: 0 | Status: SHIPPED | OUTPATIENT
Start: 2023-05-05 | End: 2023-05-05

## 2024-06-25 DIAGNOSIS — M54.16 LUMBAR RADICULOPATHY: Primary | ICD-10-CM

## 2024-06-25 DIAGNOSIS — M17.11 OSTEOARTHRITIS OF RIGHT KNEE: ICD-10-CM

## 2024-06-26 ENCOUNTER — CLINICAL SUPPORT (OUTPATIENT)
Dept: REHABILITATION | Facility: HOSPITAL | Age: 44
End: 2024-06-26
Payer: COMMERCIAL

## 2024-06-26 DIAGNOSIS — M54.16 LUMBAR RADICULOPATHY, RIGHT: Primary | ICD-10-CM

## 2024-06-26 PROCEDURE — 97530 THERAPEUTIC ACTIVITIES: CPT | Mod: PN | Performed by: PHYSICAL THERAPIST

## 2024-06-26 PROCEDURE — 97140 MANUAL THERAPY 1/> REGIONS: CPT | Mod: PN | Performed by: PHYSICAL THERAPIST

## 2024-06-26 PROCEDURE — 97161 PT EVAL LOW COMPLEX 20 MIN: CPT | Mod: PN | Performed by: PHYSICAL THERAPIST

## 2024-06-26 PROCEDURE — 97014 ELECTRIC STIMULATION THERAPY: CPT | Mod: PN | Performed by: PHYSICAL THERAPIST

## 2024-06-26 NOTE — PLAN OF CARE
[facility]   Physical Therapy Initial Evaluation      Name: Sima Pham  [facility] Number: [id number]    Therapy Diagnosis:   Encounter Diagnosis   Name Primary?    Lumbar radiculopathy, right Yes        Physician: Cuauhtemoc Mcdaniel MD    Physician Orders: PT Eval and Treat   Medical Diagnosis from Referral: M54.16 (ICD-10-CM) - Lumbar radiculopathy M17.11 (ICD-10-CM) - Osteoarthritis of right knee  Evaluation Date: 6/26/2024  Authorization Period Expiration: 12/31/2024  Plan of Care Expiration: 08/09/2024  Progress Note Due: 30 days  Visit # / Visits authorized: 1/ 1   FOTO: 1/ 3    Precautions: Standard     Time In: 1:40pm  Time Out: 2:25pm  Total Billable Time: 45 minutes    Subjective     Date of onset: 1 week now    History of current condition - Sima reports: last week Tuesday, she woke up and had immense pain in her back and R leg.  She went to the ER and then to the ortho the  next day.  She has had back pain since she was pregnant with her daughter.  She was given steroids.  Currently she is still having pain in her R leg and has continued pain in her low back.  She notices that when she walks a lot it hurts more and she cannot sleep well, she tosses and turns all night long. Her pain in her leg is deep in her leg and she is having pain in her R buttock area as well.     Falls: 0    Imaging: Xrays:     Prior Therapy: yes for her back  Social History:  lives with their family  Occupation: desk job  Prior Level of Function: WNL  Current Level of Function: Difficulty with daily activities    Pain:  Current 5/10, worst 8/10,   Location: right back , knee , and upper legs   Description: Aching, Dull, and Throbbing  Aggravating Factors: Standing  Easing Factors: rest    Patients goals: Pt would like to not have pain in her leg or back.      Medical History:   Past Medical History:   Diagnosis Date    Herpes simplex virus (HSV) infection     Hypertension        Surgical History:    Sima Pham  has a past surgical history that includes Dilation and curettage of uterus (2008).    Medications:   Sima has a current medication list which includes the following prescription(s): amlodipine, elidel, ketoconazole, ondansetron, pnv,calcium 72-iron-folic acid, prenatal vit no.124/iron/folic, and promethazine.    Allergies:   Review of patient's allergies indicates:  No Known Allergies     Objective      Posture Observation: increased right lateral trunk flexion, increased forward flexed posture     Gait Observation: slowed gait pattern, slight antalgic gait noted    Palpation:  slight TTP over the R lower back area     AROM:     Thoracolumbar  (single inclinometer at L4/5) AROM  (degrees) Pain/Dysfunction with Movement   Flexion 50%     Extension 75%     Right side bending 25%     Left side bending 50%     Right rotation 50%     Left rotation 50%              Strength:   L/E MMT Right Left Pain/Dysfunction with Movement   Hip Flexion 4+/5 4/5     Hip Extension 4+/5 4/5     Hip Abduction 4+/5 4/5     Hip Adduction 4+/5 4+/5     Hip IR 4+/5 4+/5     Hip ER 4+/5 4+/5     Knee Flexion 4+/5 4/5     Knee Extension 4+/5 4/5     Ankle DF 4+/5 4/5     Ankle PF 4-/5 4-/5         Intake Outcome Measure for FOTO 33/100 Survey    Therapist reviewed FOTO scores for Sima Pham on 6/26/2024.   FOTO report - see Media section or FOTO account episode details.    Intake Score: 44/100         Treatment     Total Treatment time (time-based codes) separate from Evaluation: 35 minutes     Sima received the treatments listed below:        manual therapy techniques: Joint mobilizations, Manual traction, Soft tissue Mobilization, and FDN were applied to the: low back for 10 minutes, including:  STM to lumbar paraspinals and gluts  FDN to lumbar paraspinals and gluts    Palpation Assessment to determine the necessity for Functional Dry Needling  lumbar paraspinals.     Patient provided written and verbal consent to  Functional Dry Needling in her chart      Patient demonstrated appropriate response to Functional Dry Needling. Good rhythmical contractions observed with estim to treated muscle groups x 5 min         therapeutic activities to improve functional performance for 25  minutes, including:  HEP: cat-camel, prayer stretch, fig 4 QL stretch, bridging, LTR, and Seated sciatic nerve glide      hot pack for 10 minutes to low back .      Patient Education and Home Exercises     Education provided:   - HEP and plan of care    Written Handout on response to FDN provided: Yes    Sima demonstrated good  understanding of the education provided.    Written Home Exercises Provided: yes. Exercises were reviewed and Sima was able to demonstrate them prior to the end of the session.  Sima demonstrated good  understanding of the education provided. See EMR under Patient Instructions for exercises provided during therapy sessions.    Assessment     Sima is a 44 y.o. female referred to outpatient Physical Therapy with a medical diagnosis of R lumbar radiculopathy. Patient presents with decreased lumbar Range of motion, decreased right lower extremity strength, pain with prolonged standing, radicular pain into the leg, and difficulty with ADLs.     Patient prognosis is Good.   Patient will benefit from skilled outpatient Physical Therapy to address the deficits stated above and in the chart below, provide patient /family education, and to maximize patientt's level of independence.     Plan of care discussed with patient: Yes  Patient's spiritual, cultural and educational needs considered and patient is agreeable to the plan of care and goals as stated below:     Anticipated Barriers for therapy: none    Medical Necessity is demonstrated by the following  History  Co-morbidities and personal factors that may impact the plan of care [x] LOW: no personal factors / co-morbidities  [] MODERATE: 1-2 personal factors / co-morbidities  []  HIGH: 3+ personal factors / co-morbidities    Moderate / High Support Documentation:   Co-morbidities affecting plan of care: See PMHx    Personal Factors:   no deficits     Examination  Body Structures and Functions, activity limitations and participation restrictions that may impact the plan of care [] LOW: addressing 1-2 elements  [x] MODERATE: 3+ elements  [] HIGH: 4+ elements (please support below)    Moderate / High Support Documentation: none     Clinical Presentation [x] LOW: stable  [] MODERATE: Evolving  [] HIGH: Unstable     Decision Making/ Complexity Score: low       Goals:  Short Term Goals: 2 weeks   Pt will be 50% independent with HEP.     Long Term Goals: 6 weeks   Pt will be 100% independent with HEP.  Pt will be able to stand for 30 minutes without difficulty.   Pt will be able to walk at least 1/2 mile without difficulty.   Pt will be able to perform heavy household activities without difficulty.   Plan     Plan of care Certification: 6/26/2024 to 08/09/2024.    Outpatient Physical Therapy 2 times weekly for 6 weeks to include the following interventions: Cervical/Lumbar Traction, Electrical Stimulation PRN, Gait Training, Manual Therapy, Moist Heat/ Ice, Neuromuscular Re-ed, Patient Education, Self Care, Therapeutic Activities, Therapeutic Exercise, and FDN .     Anita Martinez, PT        Physician's Signature: _________________________________________ Date: ________________

## 2024-06-28 PROBLEM — M54.16 LUMBAR RADICULOPATHY, RIGHT: Status: ACTIVE | Noted: 2024-06-28

## 2024-07-01 ENCOUNTER — CLINICAL SUPPORT (OUTPATIENT)
Dept: REHABILITATION | Facility: HOSPITAL | Age: 44
End: 2024-07-01
Payer: COMMERCIAL

## 2024-07-01 DIAGNOSIS — M54.16 LUMBAR RADICULOPATHY, RIGHT: Primary | ICD-10-CM

## 2024-07-01 PROCEDURE — 97014 ELECTRIC STIMULATION THERAPY: CPT | Mod: PN | Performed by: PHYSICAL THERAPIST

## 2024-07-01 PROCEDURE — 97112 NEUROMUSCULAR REEDUCATION: CPT | Mod: PN | Performed by: PHYSICAL THERAPIST

## 2024-07-01 PROCEDURE — 97110 THERAPEUTIC EXERCISES: CPT | Mod: PN | Performed by: PHYSICAL THERAPIST

## 2024-07-01 PROCEDURE — 97140 MANUAL THERAPY 1/> REGIONS: CPT | Mod: PN | Performed by: PHYSICAL THERAPIST

## 2024-07-01 NOTE — PROGRESS NOTES
"[facility]   Physical Therapy Treatment Note      Name: Sima Pham  Clinic Number: [id number]    Therapy Diagnosis:   Encounter Diagnosis   Name Primary?    Lumbar radiculopathy, right Yes     Physician: Cuauhtemoc Mcdaniel MD    Visit Date: 7/1/2024  Physician Orders: PT Eval and Treat   Medical Diagnosis from Referral: M54.16 (ICD-10-CM) - Lumbar radiculopathy M17.11 (ICD-10-CM) - Osteoarthritis of right knee  Evaluation Date: 6/26/2024  Authorization Period Expiration: 12/31/2024  Plan of Care Expiration: 08/09/2024  Progress Note Due: 30 days  Visit # / Visits authorized: 1/20 +Eval   FOTO: 1/ 3     Precautions: Standard      Time In: 7:30am  Time Out: 8:30am  Total Billable Time: 70 minutes  PTA Visit #: --/5       Subjective     Patient reports: that her back and leg continue to hurt .  She was compliant with home exercise program.  Response to previous treatment: back continues to hurt  Functional change: nothing noted    Pain: 5/10  Location: right back , buttocks , and upper legs     Objective      Objective Measures updated at progress report unless specified.     Treatment     Sima received the treatments listed below:         CPT Intervention  Low back/sciatic Duration / Intensity  07/01/2024 Tech notes   TE Bike for ROM and stretching        MT PA mobs to lumbar spine/Sacrum/R hip IR  8'     NR Hip hinges @ wall 15x     TA Sit to stand 20" box 2 x 10     NR Seated Sciatic nerve glides 20x     NR TRX Deep Squats 2x 10     NR Medex Trunk Ext 20# 3 x 10     NR Prone Windshield Wiper 20     NR Prone on elbows 10" x 5     NR [place] IR stretch 10" x 3     TE  Prayer stretch 10" x 5     TE Cat-Cow 10x     TE SKTC 15" x 5     TE Fig 4 Quadratus Lumborum stretch 10" x 5     NR PPT 5" x 20                          IFC IFC to R side of low back w/MHP 15'     PLAN               CPT Codes available for Billing:   (8) minutes of Manual therapy (MT) to improve pain and ROM.  (12) " minutes of Therapeutic Exercise (TE) to develop   strength, endurance, range of motion, and flexibility.  (38) minutes of Neuromuscular Re-Education (NR)  to improve: Balance, Coordination, Kinesthetic, Sense, Proprioception, and Posture.  (--) minutes of Therapeutic Activities (TA) to improve functional performance.  15'  Unattended Electrical Stimulation (ES) for muscle performance or pain modulation.  Vasopneumatic Device Therapy () for management of swelling/edema. (90260)  BFR: Blood flow restriction applied during exercise  NP or (-): Not Performed    Patient Education and Home Exercises       Education provided:   - HEP and plan of care    Written Home Exercises Provided: Patient instructed to cont prior HEP. Exercises were reviewed and Sima was able to demonstrate them prior to the end of the session.  Sima demonstrated good  understanding of the education provided. See Electronic Medical Record under Patient Instructions for exercises provided during therapy sessions    Assessment     Sima presents with  continued pain in the R side of the low back and into the R leg.  Therapy focused on strengthening and stretching for the back and hips.  She had the same amount of pain after her treatment session. Need to reassess at next visit.     Sima Is progressing well towards her goals.   Patient prognosis is Fair.     Patient will continue to benefit from skilled outpatient physical therapy to address the deficits listed in the problem list box on initial evaluation, provide pt/family education and to maximize pt's level of independence in the home and community environment.     Patient's spiritual, cultural and educational needs considered and pt agreeable to plan of care and goals.     Anticipated barriers to physical therapy: none    Goals: Short Term Goals: 2 weeks   Pt will be 50% independent with HEP.  Progressing     Long Term Goals: 6 weeks   Pt will be 100% independent with HEP. Progressing  Pt will  be able to stand for 30 minutes without difficulty. Progressing  Pt will be able to walk at least 1/2 mile without difficulty. Progressing  Pt will be able to perform heavy household activities without difficulty. Progressing    Plan     Plan of care Certification: 6/26/2024 to 08/09/2024.     Outpatient Physical Therapy 2 times weekly for 6 weeks to include the following interventions: Cervical/Lumbar Traction, Electrical Stimulation PRN, Gait Training, Manual Therapy, Moist Heat/ Ice, Neuromuscular Re-ed, Patient Education, Self Care, Therapeutic Activities, Therapeutic Exercise, and FDN .     Anita Martinez, PT

## 2024-07-03 ENCOUNTER — CLINICAL SUPPORT (OUTPATIENT)
Dept: REHABILITATION | Facility: HOSPITAL | Age: 44
End: 2024-07-03
Payer: COMMERCIAL

## 2024-07-03 DIAGNOSIS — M54.16 LUMBAR RADICULOPATHY, RIGHT: Primary | ICD-10-CM

## 2024-07-03 PROCEDURE — 97112 NEUROMUSCULAR REEDUCATION: CPT | Mod: PN

## 2024-07-03 PROCEDURE — 97014 ELECTRIC STIMULATION THERAPY: CPT | Mod: PN

## 2024-07-03 PROCEDURE — 97110 THERAPEUTIC EXERCISES: CPT | Mod: PN

## 2024-07-03 NOTE — PROGRESS NOTES
"[facility]   Physical Therapy Treatment Note      Name: Sima Pham  Clinic Number: [id number]    Therapy Diagnosis:   Encounter Diagnosis   Name Primary?    Lumbar radiculopathy, right Yes     Physician: Cuauhtemoc Mcdaniel MD    Visit Date: 7/3/2024  Physician Orders: PT Eval and Treat   Medical Diagnosis from Referral: M54.16 (ICD-10-CM) - Lumbar radiculopathy M17.11 (ICD-10-CM) - Osteoarthritis of right knee  Evaluation Date: 6/26/2024  Authorization Period Expiration: 12/31/2024  Plan of Care Expiration: 08/09/2024  Progress Note Due: 30 days  Visit # / Visits authorized: 2/20 +Eval   FOTO: 1/ 3     Precautions: Standard      Time In: 8:00 am  Time Out: 9:10 am  Total Billable Time: 70 minutes  PTA Visit #: --/5       Subjective     Patient reports: improved pain level since last visit   She was compliant with home exercise program.  Response to previous treatment: back continues to hurt  Functional change: nothing noted    Pain: 5/10  Location: right back , buttocks , and upper legs     Objective      Objective Measures updated at progress report unless specified.     Treatment     Sima received the treatments listed below:         CPT Intervention  Low back/sciatic Duration / Intensity  07/3/2024   TE Bike for ROM and stretching  5min    TE SKTC 15" x 5   TE LTR  2x10   NMR PPT 2x10   NMR Bridges with TA  2x10    NMR Prone femoral n. flossing  2x10    TE Sidelying hip abduction  2x10    NMR Cat-Cow 2x10    Leaning on edge of mat hip extension  2x10                     IFC IFC to R side of low back w/MHP At end of session    PLAN             CPT Codes available for Billing:   (-) minutes of Manual therapy (MT) to improve pain and ROM.  (30) minutes of Therapeutic Exercise (TE) to develop   strength, endurance, range of motion, and flexibility.  (25) minutes of Neuromuscular Re-Education (NMR)  to improve: Balance, Coordination, Kinesthetic, Sense, Proprioception, and Posture.  (--) " minutes of Therapeutic Activities (TA) to improve functional performance.  (15)'  Unattended Electrical Stimulation (ES) for muscle performance or pain modulation.  Vasopneumatic Device Therapy () for management of swelling/edema. (60645)  BFR: Blood flow restriction applied during exercise  NP or (-): Not Performed    Patient Education and Home Exercises       Education provided:   - HEP and plan of care    Written Home Exercises Provided: Patient instructed to cont prior HEP. Exercises were reviewed and Sima was able to demonstrate them prior to the end of the session.  Sima demonstrated good  understanding of the education provided. See Electronic Medical Record under Patient Instructions for exercises provided during therapy sessions    Assessment     Patient presents today with improvement in pain level to lower back, however, continue to notice it in her right lower back. Focused on strengthening deep core muscles. Assess tenderness to population to anterior thigh, no tenderness noted. Incorporated femoral nerve gliding. Pt with improvement in pain level at end of today's session.     Sima Is progressing well towards her goals.   Patient prognosis is Fair.     Patient will continue to benefit from skilled outpatient physical therapy to address the deficits listed in the problem list box on initial evaluation, provide pt/family education and to maximize pt's level of independence in the home and community environment.     Patient's spiritual, cultural and educational needs considered and pt agreeable to plan of care and goals.     Anticipated barriers to physical therapy: none    Goals: Short Term Goals: 2 weeks   Pt will be 50% independent with HEP.  Progressing     Long Term Goals: 6 weeks   Pt will be 100% independent with HEP. Progressing  Pt will be able to stand for 30 minutes without difficulty. Progressing  Pt will be able to walk at least 1/2 mile without difficulty. Progressing  Pt will be able  to perform heavy household activities without difficulty. Progressing    Plan     Plan of care Certification: 6/26/2024 to 08/09/2024.     Outpatient Physical Therapy 2 times weekly for 6 weeks to include the following interventions: Cervical/Lumbar Traction, Electrical Stimulation PRN, Gait Training, Manual Therapy, Moist Heat/ Ice, Neuromuscular Re-ed, Patient Education, Self Care, Therapeutic Activities, Therapeutic Exercise, and FDN .     Lindsey Garland, PT

## 2024-07-08 ENCOUNTER — CLINICAL SUPPORT (OUTPATIENT)
Dept: REHABILITATION | Facility: HOSPITAL | Age: 44
End: 2024-07-08
Payer: COMMERCIAL

## 2024-07-08 DIAGNOSIS — M54.16 LUMBAR RADICULOPATHY, RIGHT: Primary | ICD-10-CM

## 2024-07-08 PROCEDURE — 97110 THERAPEUTIC EXERCISES: CPT | Mod: PN

## 2024-07-08 PROCEDURE — 97140 MANUAL THERAPY 1/> REGIONS: CPT | Mod: PN

## 2024-07-08 PROCEDURE — 97014 ELECTRIC STIMULATION THERAPY: CPT | Mod: PN

## 2024-07-08 PROCEDURE — 97112 NEUROMUSCULAR REEDUCATION: CPT | Mod: PN

## 2024-07-09 NOTE — PROGRESS NOTES
"[facility]   Physical Therapy Treatment Note      Name: Sima Pham  Clinic Number: [id number]    Therapy Diagnosis:   Encounter Diagnosis   Name Primary?    Lumbar radiculopathy, right Yes     Physician: Cuauhtemoc Mcdaniel MD    Visit Date: 7/8/2024  Physician Orders: PT Eval and Treat   Medical Diagnosis from Referral: M54.16 (ICD-10-CM) - Lumbar radiculopathy M17.11 (ICD-10-CM) - Osteoarthritis of right knee  Evaluation Date: 6/26/2024  Authorization Period Expiration: 12/31/2024  Plan of Care Expiration: 08/09/2024  Progress Note Due: 30 days  Visit # / Visits authorized: 3/20 +Eval   FOTO: 1/ 3     Precautions: Standard      Time In: 3:30 am  Time Out: 4:40 am  Total Billable Time: 70 minutes  PTA Visit #: --/5       Subjective     Patient reports: Decrease pain to lower pack, however, patient says she has been using lidocaine patches. Pt reports she is scheduled for MRI after 28th.     She was compliant with home exercise program.  Response to previous treatment: back continues to hurt  Functional change: nothing noted    Pain: 5/10  Location: right back , buttocks , and upper legs     Objective      Objective Measures updated at progress report unless specified.     Treatment     Sima received the treatments listed below:         CPT Intervention  Low back/sciatic Duration / Intensity  07/8/2024   MT manual STM to anterior thigh  Lateral hip decompression, R   TE Bike for ROM and stretching  5 min   TE SKTC, R 15" x5   TE LTR  2x10   NMR PPT 2x10   NMR Knee fall outs, R 2x10   NMR Bridges with TA  2x10    NMR  SLR c TA 2x10   NMR Prone femoral n. flossing  2x10   TE Sidelying hip abduction  -   TE Cat-Cow 2x10   TE Leaning on edge of mat hip extension  2x10     ES M/H with IFC to R low back At end of session   PLAN          CPT Codes available for Billing:   (15) minutes of Manual therapy (MT) to improve pain and ROM.  (25) minutes of Therapeutic Exercise (TE) to develop "   strength, endurance, range of motion, and flexibility.  (20) minutes of Neuromuscular Re-Education (NMR)  to improve: Balance, Coordination, Kinesthetic, Sense, Proprioception, and Posture.  (-) minutes of Therapeutic Activities (TA) to improve functional performance.  Unattended Electrical Stimulation (ES) for muscle performance or pain modulation.  Vasopneumatic Device Therapy () for management of swelling/edema. (55608)  BFR: Blood flow restriction applied during exercise  NP or (-): Not Performed    Patient Education and Home Exercises       Education provided:   - HEP and plan of care    Written Home Exercises Provided: Patient instructed to cont prior HEP. Exercises were reviewed and Sima was able to demonstrate them prior to the end of the session.  Sima demonstrated good  understanding of the education provided. See Electronic Medical Record under Patient Instructions for exercises provided during therapy sessions    Assessment     Patient presents today would decrease pain following last session. Perform manual to enter your thigh and lateral hip decompression for pain reduction with success. Progressed exercises to further challenge core stability. Patient with good tolerance to exercises; mild pain to knee doing bridges. Provided moist heat and TENs at end of session for pain reduction.    Sima Is progressing well towards her goals.   Patient prognosis is Fair.     Patient will continue to benefit from skilled outpatient physical therapy to address the deficits listed in the problem list box on initial evaluation, provide pt/family education and to maximize pt's level of independence in the home and community environment.     Patient's spiritual, cultural and educational needs considered and pt agreeable to plan of care and goals.     Anticipated barriers to physical therapy: none    Goals: Short Term Goals: 2 weeks   Pt will be 50% independent with HEP.  Progressing     Long Term Goals: 6 weeks    Pt will be 100% independent with HEP. Progressing  Pt will be able to stand for 30 minutes without difficulty. Progressing  Pt will be able to walk at least 1/2 mile without difficulty. Progressing  Pt will be able to perform heavy household activities without difficulty. Progressing    Plan     Plan of care Certification: 6/26/2024 to 08/09/2024.     Outpatient Physical Therapy 2 times weekly for 6 weeks to include the following interventions: Cervical/Lumbar Traction, Electrical Stimulation PRN, Gait Training, Manual Therapy, Moist Heat/ Ice, Neuromuscular Re-ed, Patient Education, Self Care, Therapeutic Activities, Therapeutic Exercise, and FDN .     Lindsey Garland, PT

## 2024-07-11 ENCOUNTER — CLINICAL SUPPORT (OUTPATIENT)
Dept: REHABILITATION | Facility: HOSPITAL | Age: 44
End: 2024-07-11
Payer: COMMERCIAL

## 2024-07-11 DIAGNOSIS — M54.16 LUMBAR RADICULOPATHY, RIGHT: Primary | ICD-10-CM

## 2024-07-11 PROCEDURE — 97112 NEUROMUSCULAR REEDUCATION: CPT | Mod: PN

## 2024-07-11 PROCEDURE — 97110 THERAPEUTIC EXERCISES: CPT | Mod: PN

## 2024-07-11 PROCEDURE — 97014 ELECTRIC STIMULATION THERAPY: CPT | Mod: PN

## 2024-07-11 PROCEDURE — 97140 MANUAL THERAPY 1/> REGIONS: CPT | Mod: PN

## 2024-07-11 NOTE — PROGRESS NOTES
[facility]   Physical Therapy Treatment Note      Name: Sima Pham  Clinic Number: [id number]    Therapy Diagnosis:   Encounter Diagnosis   Name Primary?    Lumbar radiculopathy, right Yes     Physician: Cuauhtemoc Mcdaniel MD    Visit Date: 7/11/2024  Physician Orders: PT Eval and Treat   Medical Diagnosis from Referral: M54.16 (ICD-10-CM) - Lumbar radiculopathy M17.11 (ICD-10-CM) - Osteoarthritis of right knee  Evaluation Date: 6/26/2024  Authorization Period Expiration: 12/31/2024  Plan of Care Expiration: 08/09/2024  Progress Note Due: 30 days  Visit # / Visits authorized: 3/20 +Eval   FOTO: 1/ 3     Precautions: Standard      Time In: 2:30 am  Time Out: 3:40 am  Total Billable Time: 70 minutes  PTA Visit #: --/5       Subjective     Patient reports: significant improvement in pain level    She was compliant with home exercise program.  Response to previous treatment: back continues to hurt  Functional change: nothing noted    Pain: 5/10  Location: right back , buttocks , and upper legs     Objective      Objective Measures updated at progress report unless specified.     Treatment     Sima received the treatments listed below:         CPT Intervention  Low back/sciatic Duration / Intensity  07/11/2024   MT manual - STM to anterior thigh  - Lateral hip decompression, R   TE Bike for ROM and stretching  5 min   NMR PPT 2x10   NMR Knee fall outs, R 2x10   NMR Bridges with TA  2x10   NMR  SLR c TA 2x10   NMR Prone femoral n. flossing  2x10   TE Sidelying hip abduction  2x10   TE Cat-Cow 2x10   TE Leaning on edge of mat hip extension  2x10   NMR Paloff press, B  2x10, double RSC   NMR Farmer's carry  1 lap full turf  2x10 10#    ES M/H with IFC to R low back At end of session   PLAN         CPT Codes available for Billing:   (10) minutes of Manual therapy (MT) to improve pain and ROM.  (25) minutes of Therapeutic Exercise (TE) to develop   strength, endurance, range of motion, and  flexibility.  (25) minutes of Neuromuscular Re-Education (NMR)  to improve: Balance, Coordination, Kinesthetic, Sense, Proprioception, and Posture.  (-) minutes of Therapeutic Activities (TA) to improve functional performance.  Unattended Electrical Stimulation (ES) for muscle performance or pain modulation.  Vasopneumatic Device Therapy () for management of swelling/edema. (74630)  BFR: Blood flow restriction applied during exercise  NP or (-): Not Performed    Patient Education and Home Exercises       Education provided:   - HEP and plan of care    Written Home Exercises Provided: Patient instructed to cont prior HEP. Exercises were reviewed and Sima was able to demonstrate them prior to the end of the session.  Sima demonstrated good  understanding of the education provided. See Electronic Medical Record under Patient Instructions for exercises provided during therapy sessions    Assessment     Patient presents to clinic feeling significantly better. Pt with decreased tension to anterior hip and she is able tolerate neutral hip extension at this time. Incorporated exercises to further challenge core stability. Pt with no increase of pain during today's session. Provided moist heat and TENs at end of session for pain reduction.    Sima Is progressing well towards her goals.   Patient prognosis is Fair.     Patient will continue to benefit from skilled outpatient physical therapy to address the deficits listed in the problem list box on initial evaluation, provide pt/family education and to maximize pt's level of independence in the home and community environment.     Patient's spiritual, cultural and educational needs considered and pt agreeable to plan of care and goals.     Anticipated barriers to physical therapy: none    Goals: Short Term Goals: 2 weeks   Pt will be 50% independent with HEP.  Progressing     Long Term Goals: 6 weeks   Pt will be 100% independent with HEP. Progressing  Pt will be able to  stand for 30 minutes without difficulty. Progressing  Pt will be able to walk at least 1/2 mile without difficulty. Progressing  Pt will be able to perform heavy household activities without difficulty. Progressing    Plan     Plan of care Certification: 6/26/2024 to 08/09/2024.     Outpatient Physical Therapy 2 times weekly for 6 weeks to include the following interventions: Cervical/Lumbar Traction, Electrical Stimulation PRN, Gait Training, Manual Therapy, Moist Heat/ Ice, Neuromuscular Re-ed, Patient Education, Self Care, Therapeutic Activities, Therapeutic Exercise, and FDN .     Lindsey Garland, PT

## 2024-07-15 ENCOUNTER — CLINICAL SUPPORT (OUTPATIENT)
Dept: REHABILITATION | Facility: HOSPITAL | Age: 44
End: 2024-07-15
Payer: COMMERCIAL

## 2024-07-15 DIAGNOSIS — M54.16 LUMBAR RADICULOPATHY, RIGHT: Primary | ICD-10-CM

## 2024-07-15 PROCEDURE — 97110 THERAPEUTIC EXERCISES: CPT | Mod: PN

## 2024-07-15 PROCEDURE — 97112 NEUROMUSCULAR REEDUCATION: CPT | Mod: PN

## 2024-07-15 PROCEDURE — 97140 MANUAL THERAPY 1/> REGIONS: CPT | Mod: PN

## 2024-07-15 NOTE — PROGRESS NOTES
"[facility]   Physical Therapy Treatment Note      Name: Sima Pham  Clinic Number: [id number]    Therapy Diagnosis:   Encounter Diagnosis   Name Primary?    Lumbar radiculopathy, right Yes     Physician: Cuauhtemoc Mcdaniel MD    Visit Date: 7/15/2024  Physician Orders: PT Eval and Treat   Medical Diagnosis from Referral: M54.16 (ICD-10-CM) - Lumbar radiculopathy M17.11 (ICD-10-CM) - Osteoarthritis of right knee  Evaluation Date: 6/26/2024  Authorization Period Expiration: 12/31/2024  Plan of Care Expiration: 08/09/2024  Progress Note Due: 30 days  Visit # / Visits authorized: 5/20 +Eval   FOTO: 1/ 3     Precautions: Standard      Time In: 8:00 am  Time Out: 9:10 am  Total Billable Time: 70 minutes  PTA Visit #: --/5       Subjective     Patient reports: increased low back and right knee pain this past weekend. Denies any pain or soreness from last session.    She was compliant with home exercise program.  Response to previous treatment: back continues to hurt  Functional change: nothing noted    Pain: 4/10  Location: right back , buttocks , and upper legs     Objective      Objective Measures updated at progress report unless specified.     Treatment     Sima received the treatments listed below:         CPT Intervention  Low back/sciatic Duration / Intensity  07/15/2024   TE Bike for ROM and stretching  5 min    MT manual STM to anterior thigh  Lateral hip decompression, R   NMR Quad set, R  2x10    TE Prone quad str, R  3x30"    NMR Knee fall outs, R 2x10    NMR Bridges with TA  3x10     NMR SLR c TA, R 2x10   NMR Prone femoral n. flossing  2x10   TE Sidelying hip abduction  2x10   TE Leaning on edge of mat hip extension  2x10 #2   NMR Paloff press, B  2x10, double RSC   NMR Anguiano's c emphasize on TA  2 lap full turf 10#     Step ups, 4 inch, R  2x10  Fwd  Lateral    ES M/H with IFC to R low back     PLAN   increased step up      CPT Codes available for Billing:   (10) minutes of " Manual therapy (MT) to improve pain and ROM.  (20) minutes of Therapeutic Exercise (TE) to develop   strength, endurance, range of motion, and flexibility.  (30) minutes of Neuromuscular Re-Education (NMR)  to improve: Balance, Coordination, Kinesthetic, Sense, Proprioception, and Posture.  (-) minutes of Therapeutic Activities (TA) to improve functional performance.  Unattended Electrical Stimulation (ES) for muscle performance or pain modulation.  Vasopneumatic Device Therapy () for management of swelling/edema. (78186)  BFR: Blood flow restriction applied during exercise  NP or (-): Not Performed    Patient Education and Home Exercises       Education provided:   - HEP and plan of care    Written Home Exercises Provided: Patient instructed to cont prior HEP. Exercises were reviewed and Sima was able to demonstrate them prior to the end of the session.  Sima demonstrated good  understanding of the education provided. See Electronic Medical Record under Patient Instructions for exercises provided during therapy sessions    Assessment     Patient presents to clinic with pain to low back and right knee today. Performed manual to address subjective complaint.Incorporated additional exercises to target right leg. Pt with no increase pain during or after today's session. Provided moist heat and TENs at end of session for pain reduction.    Sima Is progressing well towards her goals.   Patient prognosis is Fair.     Patient will continue to benefit from skilled outpatient physical therapy to address the deficits listed in the problem list box on initial evaluation, provide pt/family education and to maximize pt's level of independence in the home and community environment.     Patient's spiritual, cultural and educational needs considered and pt agreeable to plan of care and goals.     Anticipated barriers to physical therapy: none    Goals: Short Term Goals: 2 weeks   Pt will be 50% independent with HEP.   Progressing     Long Term Goals: 6 weeks   Pt will be 100% independent with HEP. Progressing  Pt will be able to stand for 30 minutes without difficulty. Progressing  Pt will be able to walk at least 1/2 mile without difficulty. Progressing  Pt will be able to perform heavy household activities without difficulty. Progressing    Plan     Plan of care Certification: 6/26/2024 to 08/09/2024.     Outpatient Physical Therapy 2 times weekly for 6 weeks to include the following interventions: Cervical/Lumbar Traction, Electrical Stimulation PRN, Gait Training, Manual Therapy, Moist Heat/ Ice, Neuromuscular Re-ed, Patient Education, Self Care, Therapeutic Activities, Therapeutic Exercise, and FDN .     Lindsey Garland, PT

## 2024-07-22 ENCOUNTER — CLINICAL SUPPORT (OUTPATIENT)
Dept: REHABILITATION | Facility: HOSPITAL | Age: 44
End: 2024-07-22
Payer: COMMERCIAL

## 2024-07-22 DIAGNOSIS — M54.16 LUMBAR RADICULOPATHY, RIGHT: Primary | ICD-10-CM

## 2024-07-22 PROCEDURE — 97110 THERAPEUTIC EXERCISES: CPT | Mod: PN | Performed by: PHYSICAL THERAPIST

## 2024-07-22 PROCEDURE — 97140 MANUAL THERAPY 1/> REGIONS: CPT | Mod: PN | Performed by: PHYSICAL THERAPIST

## 2024-07-22 PROCEDURE — 97112 NEUROMUSCULAR REEDUCATION: CPT | Mod: PN | Performed by: PHYSICAL THERAPIST

## 2024-07-22 NOTE — PROGRESS NOTES
"[facility]   Physical Therapy Treatment Note      Name: Sima Pham  Clinic Number: [id number]    Therapy Diagnosis:   Encounter Diagnosis   Name Primary?    Lumbar radiculopathy, right Yes     Physician: Cuauhtemoc Mcdaniel MD    Visit Date: 7/22/2024  Physician Orders: PT Eval and Treat   Medical Diagnosis from Referral: M54.16 (ICD-10-CM) - Lumbar radiculopathy M17.11 (ICD-10-CM) - Osteoarthritis of right knee  Evaluation Date: 6/26/2024  Authorization Period Expiration: 12/31/2024  Plan of Care Expiration: 08/09/2024  Progress Note Due: 30 days  Visit # / Visits authorized: 5/20 +Eval   FOTO: 1/ 3     Precautions: Standard      Time In: 7:35am  Time Out: 8:40am  Total Billable Time: 65 minutes  PTA Visit #: --/5       Subjective     Patient reports: her back feels good when she comes in.  But when she starts to do her exercises she does start to feel some pain in her back.  She reports that overall her back pain is better but still having pain at night which is what bothers her the most.     She was compliant with home exercise program.  Response to previous treatment: back continues to hurt  Functional change: nothing noted    Pain: 4/10  Location: right back , buttocks , and upper legs     Objective      Objective Measures updated at progress report unless specified.     Treatment     Sima received the treatments listed below:         CPT Intervention  Low back/sciatic Duration / Intensity  07/15/2024   TE Bike for ROM and stretching  5 min    MT manual STM to anterior thigh  Lateral hip decompression, R   NMR Quad set, R  2x10    TE Prone quad str, R  3x30"    NMR Knee fall outs, R 2x10    NMR Bridges with TA  3x10    NMR SLR c TA, R 2x10   NMR Prone femoral n. flossing  2x10   TE Sidelying hip abduction  2x10   TE Leaning on edge of mat hip extension  2x10 #2   NMR Paloff press, B  2x10, double RSC   NMR Anguiano's c emphasize on TA  2 lap full turf 10#     Step ups, 4 inch, R  " 2x10  Fwd  Lateral    ES M/H with IFC to R low back     PLAN   increased step up      CPT Codes available for Billing:   (10) minutes of Manual therapy (MT) to improve pain and ROM.  (17) minutes of Therapeutic Exercise (TE) to develop   strength, endurance, range of motion, and flexibility.  (38) minutes of Neuromuscular Re-Education (NMR)  to improve: Balance, Coordination, Kinesthetic, Sense, Proprioception, and Posture.  (-) minutes of Therapeutic Activities (TA) to improve functional performance.  Unattended Electrical Stimulation (ES) for muscle performance or pain modulation.  Vasopneumatic Device Therapy () for management of swelling/edema. (22936)  BFR: Blood flow restriction applied during exercise  NP or (-): Not Performed    Patient Education and Home Exercises       Education provided:   - HEP and plan of care    Written Home Exercises Provided: Patient instructed to cont prior HEP. Exercises were reviewed and Sima was able to demonstrate them prior to the end of the session.  Sima demonstrated good  understanding of the education provided. See Electronic Medical Record under Patient Instructions for exercises provided during therapy sessions    Assessment     Patient presents to clinic with pain to low back and right knee today.  She started to have increased pain after doing exercises.  She has relief with R hip distraction.  Continued with core strengthening exercises this visit, that she tolerated well.  She felt ok after her treatment.     Sima Is progressing well towards her goals.   Patient prognosis is Fair.     Patient will continue to benefit from skilled outpatient physical therapy to address the deficits listed in the problem list box on initial evaluation, provide pt/family education and to maximize pt's level of independence in the home and community environment.     Patient's spiritual, cultural and educational needs considered and pt agreeable to plan of care and goals.      Anticipated barriers to physical therapy: none    Goals: Short Term Goals: 2 weeks   Pt will be 50% independent with HEP.  Progressing     Long Term Goals: 6 weeks   Pt will be 100% independent with HEP. Progressing  Pt will be able to stand for 30 minutes without difficulty. Progressing  Pt will be able to walk at least 1/2 mile without difficulty. Progressing  Pt will be able to perform heavy household activities without difficulty. Progressing    Plan     Plan of care Certification: 6/26/2024 to 08/09/2024.     Outpatient Physical Therapy 2 times weekly for 6 weeks to include the following interventions: Cervical/Lumbar Traction, Electrical Stimulation PRN, Gait Training, Manual Therapy, Moist Heat/ Ice, Neuromuscular Re-ed, Patient Education, Self Care, Therapeutic Activities, Therapeutic Exercise, and FDN .     Anita Martinez, PT

## 2024-08-01 ENCOUNTER — CLINICAL SUPPORT (OUTPATIENT)
Dept: REHABILITATION | Facility: HOSPITAL | Age: 44
End: 2024-08-01
Payer: COMMERCIAL

## 2024-08-01 DIAGNOSIS — M54.16 LUMBAR RADICULOPATHY, RIGHT: Primary | ICD-10-CM

## 2024-08-01 PROCEDURE — 97112 NEUROMUSCULAR REEDUCATION: CPT | Mod: PN

## 2024-08-01 PROCEDURE — 97110 THERAPEUTIC EXERCISES: CPT | Mod: PN

## 2024-08-01 NOTE — PROGRESS NOTES
[facility]   Physical Therapy Treatment Note      Name: Sima Pham  Clinic Number: [id number]    Therapy Diagnosis:   Encounter Diagnosis   Name Primary?    Lumbar radiculopathy, right Yes     Physician: Cuauhtemoc Mcdaniel MD    Visit Date: 8/1/2024  Physician Orders: PT Eval and Treat   Medical Diagnosis from Referral: M54.16 (ICD-10-CM) - Lumbar radiculopathy M17.11 (ICD-10-CM) - Osteoarthritis of right knee  Evaluation Date: 6/26/2024  Authorization Period Expiration: 12/31/2024  Plan of Care Expiration: 08/09/2024  Progress Note Due: 30 days  Visit # / Visits authorized: 7/20 +Eval   FOTO: 1/ 3     Precautions: Standard      Time In: 1:30 pm  Time Out: 2:30 pm  Total Billable Time: 60 minutes  PTA Visit #: --/5       Subjective     Patient reports: she just returned from vacation and was able to enjoy her vacation with minimal to no pain. Pt reports feeling significantly better at this time    She was compliant with home exercise program.  Response to previous treatment: back continues to hurt  Functional change: nothing noted    Pain: 4/10  Location: right back , buttocks , and upper legs     Objective      Objective Measures updated at progress report unless specified.     Treatment     Sima received the treatments listed below:         CPT Intervention  Low back/sciatic Duration / Intensity  8/1   TE Bike for ROM and stretching  6 min   MT manual NT   TE Quad set, R  2x10   TE Fig 4 QL stretch 10 x 5 ea   NMR Dead bug hold 5 x10 ea     Supine bridge marches 2x10   MNR Bird dogs  5x10 ea    NMR SLR c TA, R 2x10   C CA   TE Sidelying hip abduction  2x10   TE Leaning on edge of mat hip extension  2x10, 4#   NMR Paloff press, B  2x10, double RSC   NMR Anguiano's c emphasize on TA   2 lap full turf 10#     Step ups, 4 inch, R  2x10  FWD  Lateral    ES M/H with IFC to R low back   NT   PLAN         CPT Codes available for Billing:   (-) minutes of Manual therapy (MT) to improve pain  and ROM.  (30) minutes of Therapeutic Exercise (TE) to develop   strength, endurance, range of motion, and flexibility.  (30) minutes of Neuromuscular Re-Education (NMR)  to improve: Balance, Coordination, Kinesthetic, Sense, Proprioception, and Posture.  (-) minutes of Therapeutic Activities (TA) to improve functional performance.  Unattended Electrical Stimulation (ES) for muscle performance or pain modulation.  Vasopneumatic Device Therapy () for management of swelling/edema. (82530)  BFR: Blood flow restriction applied during exercise  NP or (-): Not Performed    Patient Education and Home Exercises       Education provided:   - HEP and plan of care    Written Home Exercises Provided: Patient instructed to cont prior HEP. Exercises were reviewed and Sima was able to demonstrate them prior to the end of the session.  Sima demonstrated good  understanding of the education provided. See Electronic Medical Record under Patient Instructions for exercises provided during therapy sessions    Assessment     Pt presents to clinic with no pain today. Progressed exercises to challenge core. Cues for proper form. Pt with no increase of pain during today's session. Pending no regression, discharge next visit.     Sima Is progressing well towards her goals.   Patient prognosis is Fair.     Patient will continue to benefit from skilled outpatient physical therapy to address the deficits listed in the problem list box on initial evaluation, provide pt/family education and to maximize pt's level of independence in the home and community environment.     Patient's spiritual, cultural and educational needs considered and pt agreeable to plan of care and goals.     Anticipated barriers to physical therapy: none    Goals: Short Term Goals: 2 weeks   Pt will be 50% independent with HEP.  Progressing     Long Term Goals: 6 weeks   Pt will be 100% independent with HEP. Progressing  Pt will be able to stand for 30 minutes without  difficulty. Progressing  Pt will be able to walk at least 1/2 mile without difficulty. Progressing  Pt will be able to perform heavy household activities without difficulty. Progressing    Plan     Plan of care Certification: 6/26/2024 to 08/09/2024.     Outpatient Physical Therapy 2 times weekly for 6 weeks to include the following interventions: Cervical/Lumbar Traction, Electrical Stimulation PRN, Gait Training, Manual Therapy, Moist Heat/ Ice, Neuromuscular Re-ed, Patient Education, Self Care, Therapeutic Activities, Therapeutic Exercise, and FDN .     Lindsey Garland, PT